# Patient Record
Sex: MALE | Race: WHITE | Employment: OTHER | URBAN - METROPOLITAN AREA
[De-identification: names, ages, dates, MRNs, and addresses within clinical notes are randomized per-mention and may not be internally consistent; named-entity substitution may affect disease eponyms.]

---

## 2017-01-26 RX ORDER — HYDROCODONE BITARTRATE AND ACETAMINOPHEN 5; 325 MG/1; MG/1
1 TABLET ORAL EVERY 6 HOURS PRN
Qty: 30 TABLET | Refills: 0 | Status: SHIPPED | OUTPATIENT
Start: 2017-01-26 | End: 2017-08-28

## 2017-01-26 RX ORDER — CYCLOBENZAPRINE HCL 10 MG
10 TABLET ORAL EVERY 8 HOURS PRN
Qty: 30 TABLET | Refills: 0 | Status: SHIPPED | OUTPATIENT
Start: 2017-01-26 | End: 2017-08-28

## 2017-01-27 ENCOUNTER — TELEPHONE (OUTPATIENT)
Dept: FAMILY MEDICINE CLINIC | Age: 72
End: 2017-01-27

## 2017-03-13 ENCOUNTER — TELEPHONE (OUTPATIENT)
Dept: FAMILY MEDICINE CLINIC | Age: 72
End: 2017-03-13

## 2017-03-13 LAB
A/G RATIO: 2.3 (ref 1.5–2.5)
ALBUMIN SERPL-MCNC: 4.5 GM/DL (ref 3.5–5)
ALP BLD-CCNC: 67 IU/L (ref 41–137)
ALT SERPL-CCNC: 21 IU/L (ref 10–40)
ANION GAP SERPL CALCULATED.3IONS-SCNC: 10 MMOL/L (ref 4–12)
AST SERPL-CCNC: 16 IU/L (ref 15–41)
BILIRUB SERPL-MCNC: 0.7 MG/DL (ref 0.2–1)
BUN BLDV-MCNC: 17 MG/DL (ref 7–20)
CALCIUM SERPL-MCNC: 9.2 MG/DL (ref 8.8–10.5)
CHLORIDE BLD-SCNC: 105 MEQ/L (ref 101–111)
CHOLESTEROL/HDL RELATIVE RISK: 3.2 (ref 4–5)
CHOLESTEROL: 154 MG/DL
CO2: 27 MEQ/L (ref 21–32)
CREAT SERPL-MCNC: 0.96 MG/DL (ref 0.7–1.3)
CREATININE CLEARANCE: >60
DIRECT-LDL / HDL RISK: 2.2
GLUCOSE: 107 MG/DL (ref 70–110)
HDLC SERPL-MCNC: 47 MG/DL
LDL CHOLESTEROL DIRECT: 106 MG/DL
POTASSIUM SERPL-SCNC: 4.5 MEQ/L (ref 3.6–5)
SODIUM BLD-SCNC: 142 MEQ/L (ref 135–145)
TOTAL PROTEIN: 6.5 G/DL (ref 6.2–8)
TRIGL SERPL-MCNC: 146 MG/DL
VLDLC SERPL CALC-MCNC: 29 MG/DL

## 2017-03-22 RX ORDER — RIZATRIPTAN BENZOATE 10 MG/1
10 TABLET ORAL
Qty: 30 TABLET | Refills: 0 | Status: SHIPPED | OUTPATIENT
Start: 2017-03-22 | End: 2017-11-06 | Stop reason: SDUPTHER

## 2017-04-24 RX ORDER — PITAVASTATIN CALCIUM 4.18 MG/1
TABLET, FILM COATED ORAL
Qty: 30 TABLET | Refills: 5 | Status: SHIPPED | OUTPATIENT
Start: 2017-04-24 | End: 2017-11-06 | Stop reason: SDUPTHER

## 2017-04-25 ENCOUNTER — OFFICE VISIT (OUTPATIENT)
Dept: FAMILY MEDICINE CLINIC | Age: 72
End: 2017-04-25

## 2017-04-25 ENCOUNTER — TELEPHONE (OUTPATIENT)
Dept: FAMILY MEDICINE CLINIC | Age: 72
End: 2017-04-25

## 2017-04-25 VITALS
TEMPERATURE: 97.6 F | RESPIRATION RATE: 24 BRPM | BODY MASS INDEX: 34.07 KG/M2 | SYSTOLIC BLOOD PRESSURE: 116 MMHG | DIASTOLIC BLOOD PRESSURE: 68 MMHG | WEIGHT: 251.2 LBS | HEART RATE: 72 BPM

## 2017-04-25 DIAGNOSIS — E78.5 HYPERLIPIDEMIA, UNSPECIFIED HYPERLIPIDEMIA TYPE: Chronic | ICD-10-CM
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
## 2017-04-25 DIAGNOSIS — R29.818 NEUROLOGIC ABNORMALITY: Primary | ICD-10-CM

## 2017-04-25 DIAGNOSIS — I10 ESSENTIAL HYPERTENSION: Chronic | ICD-10-CM

## 2017-04-25 PROCEDURE — 99214 OFFICE O/P EST MOD 30 MIN: CPT | Performed by: FAMILY MEDICINE

## 2017-04-25 PROCEDURE — G8427 DOCREV CUR MEDS BY ELIG CLIN: HCPCS | Performed by: FAMILY MEDICINE

## 2017-04-25 PROCEDURE — 4040F PNEUMOC VAC/ADMIN/RCVD: CPT | Performed by: FAMILY MEDICINE

## 2017-04-25 PROCEDURE — 3017F COLORECTAL CA SCREEN DOC REV: CPT | Performed by: FAMILY MEDICINE

## 2017-04-25 PROCEDURE — 4004F PT TOBACCO SCREEN RCVD TLK: CPT | Performed by: FAMILY MEDICINE

## 2017-04-25 PROCEDURE — 1123F ACP DISCUSS/DSCN MKR DOCD: CPT | Performed by: FAMILY MEDICINE

## 2017-04-25 PROCEDURE — G8417 CALC BMI ABV UP PARAM F/U: HCPCS | Performed by: FAMILY MEDICINE

## 2017-04-25 RX ORDER — OMEPRAZOLE 20 MG/1
1 CAPSULE, DELAYED RELEASE ORAL DAILY
COMMUNITY
Start: 2017-04-24 | End: 2017-08-28

## 2017-04-25 RX ORDER — GABAPENTIN 300 MG/1
1 CAPSULE ORAL 2 TIMES DAILY
Refills: 2 | COMMUNITY
Start: 2017-03-31

## 2017-04-26 ASSESSMENT — ENCOUNTER SYMPTOMS
SORE THROAT: 0
SINUS PRESSURE: 0
ABDOMINAL DISTENTION: 0
RHINORRHEA: 0
CONSTIPATION: 0
SHORTNESS OF BREATH: 0
EYE PAIN: 0
COUGH: 0
DIARRHEA: 0
ABDOMINAL PAIN: 0
NAUSEA: 0

## 2017-04-28 PROBLEM — K57.32 DIVERTICULITIS OF SIGMOID COLON: Status: ACTIVE | Noted: 2017-04-28

## 2017-05-01 ENCOUNTER — TELEPHONE (OUTPATIENT)
Dept: FAMILY MEDICINE CLINIC | Age: 72
End: 2017-05-01

## 2017-05-04 ENCOUNTER — OFFICE VISIT (OUTPATIENT)
Dept: FAMILY MEDICINE CLINIC | Age: 72
End: 2017-05-04

## 2017-05-04 VITALS
BODY MASS INDEX: 33.72 KG/M2 | RESPIRATION RATE: 24 BRPM | HEART RATE: 76 BPM | DIASTOLIC BLOOD PRESSURE: 64 MMHG | HEIGHT: 72 IN | WEIGHT: 249 LBS | SYSTOLIC BLOOD PRESSURE: 100 MMHG | TEMPERATURE: 97.4 F

## 2017-05-04 DIAGNOSIS — K57.32 DIVERTICULITIS OF SIGMOID COLON: Primary | ICD-10-CM

## 2017-05-04 PROCEDURE — 99495 TRANSJ CARE MGMT MOD F2F 14D: CPT | Performed by: FAMILY MEDICINE

## 2017-05-30 ENCOUNTER — INITIAL CONSULT (OUTPATIENT)
Dept: PHYSICAL MEDICINE AND REHAB | Age: 72
End: 2017-05-30

## 2017-05-30 VITALS
DIASTOLIC BLOOD PRESSURE: 80 MMHG | SYSTOLIC BLOOD PRESSURE: 145 MMHG | WEIGHT: 250.8 LBS | HEART RATE: 64 BPM | BODY MASS INDEX: 33.97 KG/M2 | HEIGHT: 72 IN

## 2017-05-30 DIAGNOSIS — R40.4 EPISODIC ALTERED AWARENESS: ICD-10-CM

## 2017-05-30 DIAGNOSIS — R55 SYNCOPE AND COLLAPSE: Primary | ICD-10-CM

## 2017-05-30 PROCEDURE — 1123F ACP DISCUSS/DSCN MKR DOCD: CPT | Performed by: PSYCHIATRY & NEUROLOGY

## 2017-05-30 PROCEDURE — 3017F COLORECTAL CA SCREEN DOC REV: CPT | Performed by: PSYCHIATRY & NEUROLOGY

## 2017-05-30 PROCEDURE — 99204 OFFICE O/P NEW MOD 45 MIN: CPT | Performed by: PSYCHIATRY & NEUROLOGY

## 2017-05-30 PROCEDURE — 1111F DSCHRG MED/CURRENT MED MERGE: CPT | Performed by: PSYCHIATRY & NEUROLOGY

## 2017-05-30 PROCEDURE — G8427 DOCREV CUR MEDS BY ELIG CLIN: HCPCS | Performed by: PSYCHIATRY & NEUROLOGY

## 2017-05-30 PROCEDURE — G8417 CALC BMI ABV UP PARAM F/U: HCPCS | Performed by: PSYCHIATRY & NEUROLOGY

## 2017-05-30 PROCEDURE — 4004F PT TOBACCO SCREEN RCVD TLK: CPT | Performed by: PSYCHIATRY & NEUROLOGY

## 2017-05-30 PROCEDURE — 4040F PNEUMOC VAC/ADMIN/RCVD: CPT | Performed by: PSYCHIATRY & NEUROLOGY

## 2017-06-01 ENCOUNTER — TELEPHONE (OUTPATIENT)
Dept: PHYSICAL MEDICINE AND REHAB | Age: 72
End: 2017-06-01

## 2017-06-01 LAB
FOLATE: 14.7 NG/ML
VITAMIN B-12: 246 PG/ML (ref 180–914)

## 2017-06-20 ENCOUNTER — TELEPHONE (OUTPATIENT)
Dept: PHYSICAL MEDICINE AND REHAB | Age: 72
End: 2017-06-20

## 2017-06-22 RX ORDER — ALBUTEROL SULFATE 90 UG/1
2 AEROSOL, METERED RESPIRATORY (INHALATION) EVERY 6 HOURS PRN
Qty: 1 INHALER | Refills: 5 | Status: SHIPPED | OUTPATIENT
Start: 2017-06-22 | End: 2017-08-28

## 2017-07-10 ENCOUNTER — OFFICE VISIT (OUTPATIENT)
Dept: PHYSICAL MEDICINE AND REHAB | Age: 72
End: 2017-07-10

## 2017-07-10 VITALS
WEIGHT: 253.2 LBS | HEART RATE: 67 BPM | SYSTOLIC BLOOD PRESSURE: 117 MMHG | DIASTOLIC BLOOD PRESSURE: 75 MMHG | BODY MASS INDEX: 34.29 KG/M2 | HEIGHT: 72 IN

## 2017-07-10 DIAGNOSIS — R55 SYNCOPE AND COLLAPSE: Primary | ICD-10-CM

## 2017-07-10 DIAGNOSIS — R40.4 EPISODIC ALTERED AWARENESS: ICD-10-CM

## 2017-07-10 PROCEDURE — 4040F PNEUMOC VAC/ADMIN/RCVD: CPT | Performed by: PSYCHIATRY & NEUROLOGY

## 2017-07-10 PROCEDURE — 3017F COLORECTAL CA SCREEN DOC REV: CPT | Performed by: PSYCHIATRY & NEUROLOGY

## 2017-07-10 PROCEDURE — G8417 CALC BMI ABV UP PARAM F/U: HCPCS | Performed by: PSYCHIATRY & NEUROLOGY

## 2017-07-10 PROCEDURE — 1123F ACP DISCUSS/DSCN MKR DOCD: CPT | Performed by: PSYCHIATRY & NEUROLOGY

## 2017-07-10 PROCEDURE — 99213 OFFICE O/P EST LOW 20 MIN: CPT | Performed by: PSYCHIATRY & NEUROLOGY

## 2017-07-10 PROCEDURE — G8427 DOCREV CUR MEDS BY ELIG CLIN: HCPCS | Performed by: PSYCHIATRY & NEUROLOGY

## 2017-07-10 PROCEDURE — 4004F PT TOBACCO SCREEN RCVD TLK: CPT | Performed by: PSYCHIATRY & NEUROLOGY

## 2017-07-12 LAB — VITAMIN B-12: 560 PG/ML (ref 180–914)

## 2017-07-18 ENCOUNTER — HOSPITAL ENCOUNTER (OUTPATIENT)
Dept: NON INVASIVE DIAGNOSTICS | Age: 72
Discharge: HOME | End: 2017-07-18
Payer: MEDICARE

## 2017-07-18 DIAGNOSIS — R55 SYNCOPE AND COLLAPSE: ICD-10-CM

## 2017-07-18 DIAGNOSIS — R40.4 EPISODIC ALTERED AWARENESS: ICD-10-CM

## 2017-07-18 PROCEDURE — 93270 REMOTE 30 DAY ECG REV/REPORT: CPT

## 2017-07-25 ENCOUNTER — TELEPHONE (OUTPATIENT)
Dept: PHYSICAL MEDICINE AND REHAB | Age: 72
End: 2017-07-25

## 2017-08-21 ENCOUNTER — TELEPHONE (OUTPATIENT)
Dept: NEUROLOGY | Age: 72
End: 2017-08-21

## 2017-08-21 DIAGNOSIS — R55 SYNCOPE AND COLLAPSE: Primary | ICD-10-CM

## 2017-08-24 ENCOUNTER — OFFICE VISIT (OUTPATIENT)
Dept: CARDIOLOGY CLINIC | Age: 72
End: 2017-08-24
Payer: MEDICARE

## 2017-08-24 VITALS
BODY MASS INDEX: 33.97 KG/M2 | WEIGHT: 250.8 LBS | HEART RATE: 66 BPM | SYSTOLIC BLOOD PRESSURE: 162 MMHG | HEIGHT: 72 IN | DIASTOLIC BLOOD PRESSURE: 88 MMHG

## 2017-08-24 DIAGNOSIS — E78.01 FAMILIAL HYPERCHOLESTEROLEMIA: ICD-10-CM

## 2017-08-24 DIAGNOSIS — I10 ESSENTIAL HYPERTENSION: ICD-10-CM

## 2017-08-24 DIAGNOSIS — R55 SYNCOPE, UNSPECIFIED SYNCOPE TYPE: ICD-10-CM

## 2017-08-24 DIAGNOSIS — R07.2 PRECORDIAL PAIN: Primary | ICD-10-CM

## 2017-08-24 PROCEDURE — 99215 OFFICE O/P EST HI 40 MIN: CPT | Performed by: NUCLEAR MEDICINE

## 2017-08-24 PROCEDURE — 3017F COLORECTAL CA SCREEN DOC REV: CPT | Performed by: NUCLEAR MEDICINE

## 2017-08-24 PROCEDURE — 93000 ELECTROCARDIOGRAM COMPLETE: CPT | Performed by: NUCLEAR MEDICINE

## 2017-08-24 PROCEDURE — G8427 DOCREV CUR MEDS BY ELIG CLIN: HCPCS | Performed by: NUCLEAR MEDICINE

## 2017-08-24 PROCEDURE — 4040F PNEUMOC VAC/ADMIN/RCVD: CPT | Performed by: NUCLEAR MEDICINE

## 2017-08-24 PROCEDURE — G8417 CALC BMI ABV UP PARAM F/U: HCPCS | Performed by: NUCLEAR MEDICINE

## 2017-08-24 PROCEDURE — 1123F ACP DISCUSS/DSCN MKR DOCD: CPT | Performed by: NUCLEAR MEDICINE

## 2017-08-24 PROCEDURE — 4004F PT TOBACCO SCREEN RCVD TLK: CPT | Performed by: NUCLEAR MEDICINE

## 2017-08-31 ENCOUNTER — HOSPITAL ENCOUNTER (OUTPATIENT)
Dept: NON INVASIVE DIAGNOSTICS | Age: 72
Discharge: HOME | End: 2017-08-31
Payer: MEDICARE

## 2017-08-31 DIAGNOSIS — R55 SYNCOPE, UNSPECIFIED SYNCOPE TYPE: ICD-10-CM

## 2017-08-31 DIAGNOSIS — R07.2 PRECORDIAL PAIN: ICD-10-CM

## 2017-08-31 LAB
LV EF: 55 %
LVEF MODALITY: NORMAL

## 2017-08-31 PROCEDURE — 78452 HT MUSCLE IMAGE SPECT MULT: CPT

## 2017-08-31 PROCEDURE — A9500 TC99M SESTAMIBI: HCPCS | Performed by: NUCLEAR MEDICINE

## 2017-08-31 PROCEDURE — 93306 TTE W/DOPPLER COMPLETE: CPT

## 2017-08-31 PROCEDURE — 6360000002 HC RX W HCPCS

## 2017-08-31 PROCEDURE — 3430000000 HC RX DIAGNOSTIC RADIOPHARMACEUTICAL: Performed by: NUCLEAR MEDICINE

## 2017-08-31 PROCEDURE — 93017 CV STRESS TEST TRACING ONLY: CPT | Performed by: NUCLEAR MEDICINE

## 2017-08-31 PROCEDURE — 93017 CV STRESS TEST TRACING ONLY: CPT

## 2017-08-31 RX ADMIN — Medication 9.5 MILLICURIE: at 12:15

## 2017-08-31 RX ADMIN — Medication 32.8 MILLICURIE: at 13:15

## 2017-09-05 ENCOUNTER — HOSPITAL ENCOUNTER (OUTPATIENT)
Dept: INPATIENT UNIT | Age: 72
Discharge: HOME | End: 2017-09-05
Attending: NUCLEAR MEDICINE | Admitting: NUCLEAR MEDICINE
Payer: MEDICARE

## 2017-09-05 VITALS
BODY MASS INDEX: 33.86 KG/M2 | HEIGHT: 72 IN | DIASTOLIC BLOOD PRESSURE: 75 MMHG | SYSTOLIC BLOOD PRESSURE: 141 MMHG | HEART RATE: 70 BPM | TEMPERATURE: 97.7 F | RESPIRATION RATE: 19 BRPM | OXYGEN SATURATION: 96 % | WEIGHT: 250 LBS

## 2017-09-05 LAB
APTT: 30 SECONDS (ref 22–38)
INR BLD: 0.91 (ref 0.85–1.13)

## 2017-09-05 PROCEDURE — 33282 HC IMPLANTABLE LOOP RECORDER: CPT | Performed by: INTERNAL MEDICINE

## 2017-09-05 PROCEDURE — 2580000003 HC RX 258: Performed by: INTERNAL MEDICINE

## 2017-09-05 PROCEDURE — 6360000002 HC RX W HCPCS: Performed by: INTERNAL MEDICINE

## 2017-09-05 PROCEDURE — 36415 COLL VENOUS BLD VENIPUNCTURE: CPT

## 2017-09-05 PROCEDURE — A6219 GAUZE <= 16 SQ IN W/BORDER: HCPCS

## 2017-09-05 PROCEDURE — 2500000003 HC RX 250 WO HCPCS

## 2017-09-05 PROCEDURE — C1764 EVENT RECORDER, CARDIAC: HCPCS

## 2017-09-05 PROCEDURE — 6360000002 HC RX W HCPCS

## 2017-09-05 PROCEDURE — 33282 PR IMPLANTATION PT-ACTIVATED CARDIAC EVENT RECORDER: CPT | Performed by: INTERNAL MEDICINE

## 2017-09-05 PROCEDURE — 85730 THROMBOPLASTIN TIME PARTIAL: CPT

## 2017-09-05 PROCEDURE — 85610 PROTHROMBIN TIME: CPT

## 2017-09-05 RX ORDER — DOCUSATE SODIUM 100 MG/1
100 CAPSULE, LIQUID FILLED ORAL 2 TIMES DAILY
Status: DISCONTINUED | OUTPATIENT
Start: 2017-09-05 | End: 2017-09-05 | Stop reason: HOSPADM

## 2017-09-05 RX ORDER — ACETAMINOPHEN 325 MG/1
650 TABLET ORAL EVERY 4 HOURS PRN
Status: DISCONTINUED | OUTPATIENT
Start: 2017-09-05 | End: 2017-09-05 | Stop reason: HOSPADM

## 2017-09-05 RX ORDER — SODIUM CHLORIDE 0.9 % (FLUSH) 0.9 %
10 SYRINGE (ML) INJECTION PRN
Status: DISCONTINUED | OUTPATIENT
Start: 2017-09-05 | End: 2017-09-05 | Stop reason: HOSPADM

## 2017-09-05 RX ORDER — ONDANSETRON 2 MG/ML
4 INJECTION INTRAMUSCULAR; INTRAVENOUS EVERY 6 HOURS PRN
Status: DISCONTINUED | OUTPATIENT
Start: 2017-09-05 | End: 2017-09-05 | Stop reason: HOSPADM

## 2017-09-05 RX ORDER — SODIUM CHLORIDE 0.9 % (FLUSH) 0.9 %
10 SYRINGE (ML) INJECTION EVERY 12 HOURS SCHEDULED
Status: CANCELLED | OUTPATIENT
Start: 2017-09-05

## 2017-09-05 RX ORDER — SODIUM CHLORIDE 0.9 % (FLUSH) 0.9 %
10 SYRINGE (ML) INJECTION EVERY 12 HOURS SCHEDULED
Status: DISCONTINUED | OUTPATIENT
Start: 2017-09-05 | End: 2017-09-05 | Stop reason: HOSPADM

## 2017-09-05 RX ORDER — SODIUM CHLORIDE 9 MG/ML
INJECTION, SOLUTION INTRAVENOUS CONTINUOUS
Status: DISCONTINUED | OUTPATIENT
Start: 2017-09-05 | End: 2017-09-05 | Stop reason: HOSPADM

## 2017-09-05 RX ADMIN — CEFAZOLIN SODIUM 1 G: 1 INJECTION, SOLUTION INTRAVENOUS at 10:52

## 2017-09-05 RX ADMIN — SODIUM CHLORIDE: 9 INJECTION, SOLUTION INTRAVENOUS at 09:08

## 2017-09-06 ENCOUNTER — TELEPHONE (OUTPATIENT)
Dept: FAMILY MEDICINE CLINIC | Age: 72
End: 2017-09-06

## 2017-09-14 ENCOUNTER — NURSE ONLY (OUTPATIENT)
Dept: CARDIOLOGY CLINIC | Age: 72
End: 2017-09-14
Payer: MEDICARE

## 2017-09-14 DIAGNOSIS — Z45.09 ENCOUNTER FOR ELECTRONIC ANALYSIS OF REVEAL EVENT RECORDER: Primary | ICD-10-CM

## 2017-09-14 PROCEDURE — 93285 PRGRMG DEV EVAL SCRMS IP: CPT | Performed by: NUCLEAR MEDICINE

## 2017-10-09 ENCOUNTER — PROCEDURE VISIT (OUTPATIENT)
Dept: CARDIOLOGY CLINIC | Age: 72
End: 2017-10-09

## 2017-10-09 DIAGNOSIS — Z45.09 ENCOUNTER FOR ELECTRONIC ANALYSIS OF REVEAL EVENT RECORDER: Primary | ICD-10-CM

## 2017-10-12 ENCOUNTER — TELEPHONE (OUTPATIENT)
Dept: CARDIOLOGY CLINIC | Age: 72
End: 2017-10-12

## 2017-10-12 NOTE — TELEPHONE ENCOUNTER
Aware I got an alert from [facility] to have pt send a manual download.   Call to pt, asked to send, states he will try around 3:30

## 2017-10-13 ENCOUNTER — TELEPHONE (OUTPATIENT)
Dept: CARDIOLOGY CLINIC | Age: 72
End: 2017-10-13

## 2017-10-30 ENCOUNTER — OFFICE VISIT (OUTPATIENT)
Dept: NEUROLOGY | Age: 72
End: 2017-10-30
Payer: MEDICARE

## 2017-10-30 VITALS
HEART RATE: 68 BPM | HEIGHT: 72 IN | BODY MASS INDEX: 34.95 KG/M2 | DIASTOLIC BLOOD PRESSURE: 74 MMHG | WEIGHT: 258 LBS | SYSTOLIC BLOOD PRESSURE: 126 MMHG

## 2017-10-30 DIAGNOSIS — R55 SYNCOPE AND COLLAPSE: Primary | ICD-10-CM

## 2017-10-30 PROCEDURE — 99213 OFFICE O/P EST LOW 20 MIN: CPT | Performed by: PSYCHIATRY & NEUROLOGY

## 2017-10-30 PROCEDURE — G8484 FLU IMMUNIZE NO ADMIN: HCPCS | Performed by: PSYCHIATRY & NEUROLOGY

## 2017-10-30 PROCEDURE — G8417 CALC BMI ABV UP PARAM F/U: HCPCS | Performed by: PSYCHIATRY & NEUROLOGY

## 2017-10-30 PROCEDURE — 4004F PT TOBACCO SCREEN RCVD TLK: CPT | Performed by: PSYCHIATRY & NEUROLOGY

## 2017-10-30 PROCEDURE — G8427 DOCREV CUR MEDS BY ELIG CLIN: HCPCS | Performed by: PSYCHIATRY & NEUROLOGY

## 2017-10-30 PROCEDURE — 1123F ACP DISCUSS/DSCN MKR DOCD: CPT | Performed by: PSYCHIATRY & NEUROLOGY

## 2017-10-30 PROCEDURE — 4040F PNEUMOC VAC/ADMIN/RCVD: CPT | Performed by: PSYCHIATRY & NEUROLOGY

## 2017-10-30 PROCEDURE — 3017F COLORECTAL CA SCREEN DOC REV: CPT | Performed by: PSYCHIATRY & NEUROLOGY

## 2017-10-30 NOTE — PROGRESS NOTES
NEUROLOGY OUT PATIENT FOLLOW UP NOTE:  10/30/439135:09 AM    Torres Montelongo is here for follow up for syncope and episodic altered awareness. He denies new symptoms. He had testing performed and is here to go over the results. ROS:  Respiratory : no cough, no hemoptysis. Cardiac: no chest pain. No palpitations. Renal : no flank pain, no hematuria    Skin: no rash  Reviewed labs since last evaluation and discussed with patient. Allergies   Allergen Reactions    Pravastatin      Joint pain       Current Outpatient Prescriptions:     gabapentin (NEURONTIN) 300 MG capsule, Take 1 capsule by mouth 2 times daily, Disp: , Rfl: 2    LIVALO 4 MG TABS tablet, TAKE ONE TABLET BY MOUTH EVERY EVENING, Disp: 30 tablet, Rfl: 5    rizatriptan (MAXALT) 10 MG tablet, Take 1 tablet by mouth once as needed for Migraine May repeat in 2 hours if needed, Disp: 30 tablet, Rfl: 0    metoprolol tartrate (LOPRESSOR) 25 MG tablet, Take 1 tablet by mouth 2 times daily, Disp: 180 tablet, Rfl: 3    ezetimibe (ZETIA) 10 MG tablet, Take 1 tablet by mouth daily, Disp: 90 tablet, Rfl: 3    lisinopril (PRINIVIL;ZESTRIL) 20 MG tablet, Take 1 tablet by mouth daily, Disp: 90 tablet, Rfl: 3    aspirin EC 81 MG EC tablet, Take 81 mg by mouth daily. , Disp: , Rfl:       PE:   Vitals:    10/30/17 0938   BP: 126/74   Site: Left Arm   Position: Sitting   Cuff Size: Large Adult   Pulse: 68   Weight: 258 lb (117 kg)   Height: 6' (1.829 m)     General Appearance:  alert and cooperative  Skin:  Skin color, texture, turgor normal. No rashes or lesions. Gen: AO3, NAD, Language is Intact. Head: PERRL, EOMI, no icterus  Neck: There is no carotid bruits. The Neck is supple. Neuro: CN 2-12 grossly intact with no focal deficits. Power 5/5 Throughout symmetric, Reflexes are decreased and symmetric. Long tracts are intact. Cerebellar exam is Intact. Sensory exam is intact to light touch. Gait is intact.   Musculoskeletal:  Has no hand arthritis, no Senescent changes are present without acute intracranial abnormality identified.       2. Mucosal sinus disease with advanced mucosal thickening in the right maxillary sinus.         7/12/2017  8:01 PM - [facility], [facility] Component Results     Component Value Ref Range & Units Status Collected Lab   Vitamin B-12 560  180 - 914 pg/mL Final 07/12/2017  9:45 AM [facility]         Assessment:    1. Syncope and collapse          He denies new events. He was found to have a sinus pause of 3.6 sec and arrhythmia on reveal monitor and is being evaluated for pacemaker. He is on B12 Supplements. His work up is inconclusive. His exam is non focal.  After detailed discussion with patient we agreed on the following plan. Plan:  1. Continue with B12 supplements. 2. Call with any new symptoms or concerns. 3. Follow up as needed. Please call if any questions.      Corina Osborne MD

## 2017-10-31 ENCOUNTER — OFFICE VISIT (OUTPATIENT)
Dept: CARDIOLOGY CLINIC | Age: 72
End: 2017-10-31

## 2017-10-31 VITALS
SYSTOLIC BLOOD PRESSURE: 138 MMHG | DIASTOLIC BLOOD PRESSURE: 84 MMHG | HEART RATE: 80 BPM | BODY MASS INDEX: 34.62 KG/M2 | HEIGHT: 72 IN | WEIGHT: 255.6 LBS

## 2017-10-31 DIAGNOSIS — E78.5 HYPERLIPIDEMIA, UNSPECIFIED HYPERLIPIDEMIA TYPE: Chronic | ICD-10-CM

## 2017-10-31 DIAGNOSIS — R55 NEAR SYNCOPE: ICD-10-CM

## 2017-10-31 DIAGNOSIS — I10 ESSENTIAL HYPERTENSION: Chronic | ICD-10-CM

## 2017-10-31 PROCEDURE — 99024 POSTOP FOLLOW-UP VISIT: CPT | Performed by: INTERNAL MEDICINE

## 2017-10-31 ASSESSMENT — ENCOUNTER SYMPTOMS
EYES NEGATIVE: 1
GASTROINTESTINAL NEGATIVE: 1
RESPIRATORY NEGATIVE: 1

## 2017-10-31 NOTE — PROGRESS NOTES
and oriented to person, place, and time. He has normal reflexes. No cranial nerve deficit. Skin: Skin is warm. No rash noted. Psychiatric: He has a normal mood and affect.        /84   Pulse 80   Ht 6' (1.829 m)   Wt 255 lb 9.6 oz (115.9 kg)   BMI 34.67 kg/m²   Wt Readings from Last 3 Encounters:   10/31/17 255 lb 9.6 oz (115.9 kg)   10/30/17 258 lb (117 kg)   09/05/17 250 lb (113.4 kg)     BP Readings from Last 3 Encounters:   10/31/17 138/84   10/30/17 126/74   09/05/17 (!) 141/75       Lab Data  CBC:   Lab Results   Component Value Date    WBC 8.8 04/29/2017    RBC 3.83 04/29/2017    HGB 12.9 04/29/2017    HGB 14.9 04/28/2017    HGB 15.8 11/10/2016    HCT 37.7 04/29/2017    MCV 98.5 04/29/2017    MCH 33.6 04/29/2017    MCHC 34.1 04/29/2017    RDW 14.4 04/29/2017     04/29/2017     04/28/2017     11/10/2016    MPV 7.4 04/29/2017     CMP:    Lab Results   Component Value Date     04/29/2017    K 4.2 04/29/2017     04/29/2017    CO2 22 04/29/2017    BUN 10 04/29/2017    CREATININE 0.8 04/29/2017    CREATININE 0.8 04/28/2017    CREATININE 0.96 03/13/2017    AGRATIO 2.3 03/13/2017    LABGLOM >90 04/29/2017    GLUCOSE 103 04/29/2017    GLUCOSE 107 03/13/2017    PROT 6.7 04/28/2017    LABALBU 3.6 04/28/2017    CALCIUM 8.7 04/29/2017    BILITOT 0.2 04/28/2017    ALKPHOS 84 04/28/2017    AST 19 04/28/2017    ALT 22 04/28/2017     Hepatic Function Panel:    Lab Results   Component Value Date    ALKPHOS 84 04/28/2017    ALT 22 04/28/2017    AST 19 04/28/2017    PROT 6.7 04/28/2017    BILITOT 0.2 04/28/2017    BILIDIR <0.2 04/28/2017    LABALBU 3.6 04/28/2017     Magnesium:  No results found for: MG  PT/INR:    Lab Results   Component Value Date    INR 0.91 09/05/2017    INR 0.77 08/20/2013     HgBA1c:  No results found for: LABA1C  FLP:    Lab Results   Component Value Date    TRIG 146 03/13/2017    TRIG 337 11/10/2016    TRIG 213 11/18/2015    HDL 47 03/13/2017    HDL 39 11/10/2016    HDL 34 11/18/2015    HDL 37 07/07/2011    LDLCALC 122 11/26/2013    LDLCALC 64 12/28/2012    LDLCALC 97 07/03/2012    LDLDIRECT 106 03/13/2017    LDLDIRECT 181 11/10/2016    LDLDIRECT 130 11/18/2015     TSH:    Lab Results   Component Value Date    TSH 2.57 11/10/2016     No results for input(s): CKTOTAL, CKMB, CKMBINDEX, TROPONINI in the last 72 hours. Assessment:   Mr. Solange Christianson is a 71 y.o. who is known to us with history of loop recorder implantation who has been having syncopal episodes without explanation. He has had a loop recorder which showed long pause of 3.6 seconds due to complete heart block. I had a long discussion with the patient and his wife about the symptoms and findings. They have been very concerned about his symptoms, and he does not want another episode of syncope. He is an active person ands he drives. The following diagnoses were addressed during this visit:  1. Near syncope     2. Hyperlipidemia, unspecified hyperlipidemia type     3. Essential hypertension           Plan:     Schedule pacemaker implantation. I went over all his questions and concerns and he expressed that he is satisfied. All the risks and limitations of pacemaker implantation and management were explained in details to the patient and family. Bleeding, immediate and long-term infection, cardiac artrhythmias, cardiac perforation, pneumothorax, vein thrombosis and possible occlusion, chronic pain and discomfort, lead dislodgement, and even death, were explained among other potential risks. The patient and family expressed full understanding and agreed to proceed. Orders Placed:  No orders of the defined types were placed in this encounter. Medications:  No orders of the defined types were placed in this encounter. Discussed use, benefit, and side effects of prescribed medications. All patient questions answered. Pt voiced understanding.  Instructed to continue current medications, diet and exercise. Continue risk factor modification and medical management. Patient agreed with treatment plan. Follow up as directed. The patient will be seen in 1 months for re-evaluation or sooner if he develops new symptoms. In the mean time, the patient will follow up with Erika Recinos MD as scheduled.

## 2017-11-02 VITALS — BODY MASS INDEX: 34.54 KG/M2 | WEIGHT: 255 LBS | HEIGHT: 72 IN

## 2017-11-02 RX ORDER — MULTIVIT WITH MINERALS/LUTEIN
250 TABLET ORAL DAILY
Status: ON HOLD | COMMUNITY
End: 2017-11-09 | Stop reason: ALTCHOICE

## 2017-11-06 ENCOUNTER — OFFICE VISIT (OUTPATIENT)
Dept: FAMILY MEDICINE CLINIC | Age: 72
End: 2017-11-06
Payer: MEDICARE

## 2017-11-06 VITALS
HEART RATE: 72 BPM | RESPIRATION RATE: 20 BRPM | WEIGHT: 236.7 LBS | DIASTOLIC BLOOD PRESSURE: 68 MMHG | BODY MASS INDEX: 33.14 KG/M2 | HEIGHT: 71 IN | TEMPERATURE: 97.4 F | SYSTOLIC BLOOD PRESSURE: 112 MMHG

## 2017-11-06 DIAGNOSIS — K21.9 GASTROESOPHAGEAL REFLUX DISEASE, ESOPHAGITIS PRESENCE NOT SPECIFIED: ICD-10-CM

## 2017-11-06 DIAGNOSIS — Z00.00 ROUTINE GENERAL MEDICAL EXAMINATION AT A HEALTH CARE FACILITY: ICD-10-CM

## 2017-11-06 DIAGNOSIS — Z98.890 S/P LUMBAR LAMINECTOMY: ICD-10-CM

## 2017-11-06 DIAGNOSIS — E78.5 HYPERLIPIDEMIA, UNSPECIFIED HYPERLIPIDEMIA TYPE: Chronic | ICD-10-CM

## 2017-11-06 DIAGNOSIS — I49.9 CARDIAC ARRHYTHMIA, UNSPECIFIED CARDIAC ARRHYTHMIA TYPE: ICD-10-CM

## 2017-11-06 DIAGNOSIS — I10 ESSENTIAL HYPERTENSION: Primary | Chronic | ICD-10-CM

## 2017-11-06 PROCEDURE — 90662 IIV NO PRSV INCREASED AG IM: CPT | Performed by: FAMILY MEDICINE

## 2017-11-06 PROCEDURE — 4004F PT TOBACCO SCREEN RCVD TLK: CPT | Performed by: FAMILY MEDICINE

## 2017-11-06 PROCEDURE — G8417 CALC BMI ABV UP PARAM F/U: HCPCS | Performed by: FAMILY MEDICINE

## 2017-11-06 PROCEDURE — 99214 OFFICE O/P EST MOD 30 MIN: CPT | Performed by: FAMILY MEDICINE

## 2017-11-06 PROCEDURE — G8484 FLU IMMUNIZE NO ADMIN: HCPCS | Performed by: FAMILY MEDICINE

## 2017-11-06 PROCEDURE — 4040F PNEUMOC VAC/ADMIN/RCVD: CPT | Performed by: FAMILY MEDICINE

## 2017-11-06 PROCEDURE — 1123F ACP DISCUSS/DSCN MKR DOCD: CPT | Performed by: FAMILY MEDICINE

## 2017-11-06 PROCEDURE — G8427 DOCREV CUR MEDS BY ELIG CLIN: HCPCS | Performed by: FAMILY MEDICINE

## 2017-11-06 PROCEDURE — G0008 ADMIN INFLUENZA VIRUS VAC: HCPCS | Performed by: FAMILY MEDICINE

## 2017-11-06 PROCEDURE — 3017F COLORECTAL CA SCREEN DOC REV: CPT | Performed by: FAMILY MEDICINE

## 2017-11-06 RX ORDER — EZETIMIBE 10 MG/1
10 TABLET ORAL DAILY
Qty: 90 TABLET | Refills: 3 | Status: SHIPPED | OUTPATIENT
Start: 2017-11-06

## 2017-11-06 RX ORDER — RIZATRIPTAN BENZOATE 10 MG/1
10 TABLET ORAL
Qty: 30 TABLET | Refills: 0 | Status: SHIPPED | OUTPATIENT
Start: 2017-11-06 | End: 2017-11-14

## 2017-11-06 RX ORDER — LANOLIN ALCOHOL/MO/W.PET/CERES
3000 CREAM (GRAM) TOPICAL DAILY
COMMUNITY

## 2017-11-06 RX ORDER — LISINOPRIL 20 MG/1
20 TABLET ORAL DAILY
Qty: 90 TABLET | Refills: 3 | Status: SHIPPED | OUTPATIENT
Start: 2017-11-06

## 2017-11-06 NOTE — PROGRESS NOTES
After obtaining consent, and per orders of Dr Dustin Hernandez, patient given Fluzone high dose 0.5 ml in left deltoid IM by Mei Ramos CMA. Instructed to notify us of any problems.

## 2017-11-06 NOTE — PATIENT INSTRUCTIONS
You may receive a survey about your visit with us today. The feedback from our patients helps us identify what is working well and where the service to all patients can be enhanced. Thank you! Patient Education        Stopping Smoking: Care Instructions  Your Care Instructions  Cigarette smokers crave the nicotine in cigarettes. Giving it up is much harder than simply changing a habit. Your body has to stop craving the nicotine. It is hard to quit, but you can do it. There are many tools that people use to quit smoking. You may find that combining tools works best for you. There are several steps to quitting. First you get ready to quit. Then you get support to help you. After that, you learn new skills and behaviors to become a nonsmoker. For many people, a necessary step is getting and using medicine. Your doctor will help you set up the plan that best meets your needs. You may want to attend a smoking cessation program to help you quit smoking. When you choose a program, look for one that has proven success. Ask your doctor for ideas. You will greatly increase your chances of success if you take medicine as well as get counseling or join a cessation program.  Some of the changes you feel when you first quit tobacco are uncomfortable. Your body will miss the nicotine at first, and you may feel short-tempered and grumpy. You may have trouble sleeping or concentrating. Medicine can help you deal with these symptoms. You may struggle with changing your smoking habits and rituals. The last step is the tricky one: Be prepared for the smoking urge to continue for a time. This is a lot to deal with, but keep at it. You will feel better. Follow-up care is a key part of your treatment and safety. Be sure to make and go to all appointments, and call your doctor if you are having problems. Its also a good idea to know your test results and keep a list of the medicines you take.   How can you care for yourself at home?  · Ask your family, friends, and coworkers for support. You have a better chance of quitting if you have help and support. · Join a support group, such as Nicotine Anonymous, for people who are trying to quit smoking. · Consider signing up for a smoking cessation program, such as the American Lung Association's Freedom from Smoking program.  · Set a quit date. Pick your date carefully so that it is not right in the middle of a big deadline or stressful time. Once you quit, do not even take a puff. Get rid of all ashtrays and lighters after your last cigarette. Clean your house and your clothes so that they do not smell of smoke. · Learn how to be a nonsmoker. Think about ways you can avoid those things that make you reach for a cigarette. ¨ Avoid situations that put you at greatest risk for smoking. For some people, it is hard to have a drink with friends without smoking. For others, they might skip a coffee break with coworkers who smoke. ¨ Change your daily routine. Take a different route to work or eat a meal in a different place. · Cut down on stress. Calm yourself or release tension by doing an activity you enjoy, such as reading a book, taking a hot bath, or gardening. · Talk to your doctor or pharmacist about nicotine replacement therapy, which replaces the nicotine in your body. You still get nicotine but you do not use tobacco. Nicotine replacement products help you slowly reduce the amount of nicotine you need. These products come in several forms, many of them available over-the-counter:  ¨ Nicotine patches  ¨ Nicotine gum and lozenges  ¨ Nicotine inhaler  · Ask your doctor about bupropion (Wellbutrin) or varenicline (Chantix), which are prescription medicines. They do not contain nicotine. They help you by reducing withdrawal symptoms, such as stress and anxiety. · Some people find hypnosis, acupuncture, and massage helpful for ending the smoking habit.   · Eat a healthy diet and get regular exercise. Having healthy habits will help your body move past its craving for nicotine. · Be prepared to keep trying. Most people are not successful the first few times they try to quit. Do not get mad at yourself if you smoke again. Make a list of things you learned and think about when you want to try again, such as next week, next month, or next year. Where can you learn more? Go to https://Nanosyspegeraldine.ThirdLove. org and sign in to your [employer] account. Enter Q466 in the [employer] box to learn more about \"Stopping Smoking: Care Instructions. \"     If you do not have an account, please click on the \"Sign Up Now\" link. Current as of: March 20, 2017  Content Version: 11.3  © 6382-0341 [employer]. Care instructions adapted under license by [facility] ([facility]). If you have questions about a medical condition or this instruction, always ask your healthcare professional. [place] [address] any warranty or liability for your use of this information.

## 2017-11-07 ASSESSMENT — ENCOUNTER SYMPTOMS
RHINORRHEA: 0
WHEEZING: 0
CONSTIPATION: 0
BACK PAIN: 0
DIARRHEA: 0
ABDOMINAL PAIN: 0
SORE THROAT: 0
NAUSEA: 0
VOMITING: 0
COUGH: 0
SHORTNESS OF BREATH: 0

## 2017-11-07 NOTE — PROGRESS NOTES
Procedure Laterality Date    CARDIAC CATHETERIZATION  2009??    normal results Flores Silver  [id number]    Dr. Rogers Orozco in 5 years   [address]  2002??    bilateral ctaracts    LUMBAR SPINE SURGERY  09/12/2013    L4-S1 decompression L5-S1 posterior fusion , greater trochanteric injection;rt.  OTHER SURGICAL HISTORY  01/2013    excision of posterior neck cyst    OTHER SURGICAL HISTORY  09/2017    link placed   Dimas Kaplan 59      as a child    UPPER GASTROINTESTINAL ENDOSCOPY  2016     Family History   Problem Relation Age of Onset    Heart Disease Father     Cancer Father     Breast Cancer Father     Heart Disease Mother     Kidney Disease Brother     Heart Disease Brother     Prostate Cancer Brother     Cancer Brother      prostate    Heart Disease Brother     Kidney Disease Brother     Diabetes Neg Hx      Social History   Substance Use Topics    Smoking status: Current Every Day Smoker     Years: 30.00     Types: Cigars     Last attempt to quit: 1/1/1986    Smokeless tobacco: Never Used      Comment: 3/4 cigars  a day   don't inhale.  Printed to avs    Alcohol use Yes      Comment: rarely      Current Outpatient Prescriptions   Medication Sig Dispense Refill    vitamin B-12 (CYANOCOBALAMIN) 1000 MCG tablet Take 3,000 mcg by mouth daily      pitavastatin (LIVALO) 4 MG TABS tablet TAKE ONE TABLET BY MOUTH EVERY EVENING 90 tablet 3    rizatriptan (MAXALT) 10 MG tablet Take 1 tablet by mouth once as needed for Migraine May repeat in 2 hours if needed 30 tablet 0    metoprolol tartrate (LOPRESSOR) 25 MG tablet Take 1 tablet by mouth 2 times daily 180 tablet 3    ezetimibe (ZETIA) 10 MG tablet Take 1 tablet by mouth daily 90 tablet 3    lisinopril (PRINIVIL;ZESTRIL) 20 MG tablet Take 1 tablet by mouth daily 90 tablet 3    Ascorbic Acid (VITAMIN C) 250 MG tablet Take 250 mg by mouth daily      Multiple Vitamins-Minerals (MULTIVITAMIN PO) Take by mouth daily      gabapentin (NEURONTIN) 300 MG capsule Take 1 capsule by mouth 2 times daily  2    aspirin EC 81 MG EC tablet Take 81 mg by mouth daily. No current facility-administered medications for this visit. Allergies   Allergen Reactions    Pravastatin      Joint pain     Health Maintenance   Topic Date Due    DTaP/Tdap/Td vaccine (1 - Tdap) 07/23/1965    Lipid screen  03/13/2022    Colon cancer screen colonoscopy  06/09/2027    Zostavax vaccine  Addressed    Flu vaccine  Completed    Pneumococcal low/med risk  Completed    AAA screen  Completed    Hepatitis C screen  Completed         Objective:     Physical Exam   Constitutional: He is oriented to person, place, and time. No distress. HENT:   Mouth/Throat: Oropharynx is clear and moist. No oropharyngeal exudate. Eyes: Conjunctivae are normal.   Neck: No thyromegaly present. No carotid bruits   Cardiovascular: Normal rate, regular rhythm, normal heart sounds and intact distal pulses. No murmur heard. Pulmonary/Chest: Breath sounds normal. He has no wheezes. He has no rales. Abdominal: Soft. Bowel sounds are normal. He exhibits no distension and no mass. There is no tenderness. There is no rebound and no guarding. Musculoskeletal: Normal range of motion. He exhibits no edema or tenderness. Lymphadenopathy:     He has no cervical adenopathy. Neurological: He is alert and oriented to person, place, and time. He has normal reflexes. No cranial nerve deficit. Skin: Skin is dry. No rash noted. Psychiatric: He has a normal mood and affect. Vitals reviewed. /68 (Site: Left Arm, Position: Sitting, Cuff Size: Large Adult)   Pulse 72   Temp 97.4 °F (36.3 °C)   Resp 20   Ht 5' 11\" (1.803 m)   Wt 236 lb 11.2 oz (107.4 kg)   BMI 33.01 kg/m²       Impression/Plan:  1. Essential hypertension    2. Hyperlipidemia, unspecified hyperlipidemia type    3.  Cardiac in voice recognition technology. **       Electronically signed by Stanislaw Palmer MD on 11/7/2017 at 7:27 AM

## 2017-11-08 LAB
A/G RATIO: 1.7 (ref 1.5–2.5)
ABSOLUTE BASO #: 0 /CMM (ref 0–200)
ABSOLUTE EOS #: 100 /CMM (ref 0–500)
ABSOLUTE LYMPH #: 2300 /CMM (ref 1000–4800)
ABSOLUTE MONO #: 700 /CMM (ref 0–800)
ABSOLUTE NEUT #: 3200 /CMM (ref 1800–7700)
ALBUMIN SERPL-MCNC: 4.3 GM/DL (ref 3.5–5)
ALP BLD-CCNC: 56 IU/L (ref 41–137)
ALT SERPL-CCNC: 18 IU/L (ref 10–40)
ANION GAP SERPL CALCULATED.3IONS-SCNC: 8 MMOL/L (ref 4–12)
AST SERPL-CCNC: 19 IU/L (ref 15–41)
BASOPHILS RELATIVE PERCENT: 0.2 % (ref 0–2)
BILIRUB SERPL-MCNC: 0.4 MG/DL (ref 0.2–1)
BUN BLDV-MCNC: 11 MG/DL (ref 7–20)
CALCIUM SERPL-MCNC: 9.1 MG/DL (ref 8.8–10.5)
CHLORIDE BLD-SCNC: 107 MEQ/L (ref 101–111)
CHOLESTEROL/HDL RELATIVE RISK: 3.8 (ref 4–5)
CHOLESTEROL: 139 MG/DL
CO2: 26 MEQ/L (ref 21–32)
CREAT SERPL-MCNC: 0.92 MG/DL (ref 0.7–1.3)
CREATININE CLEARANCE: >60
DIRECT-LDL / HDL RISK: 2.7
EOSINOPHILS RELATIVE PERCENT: 1.8 % (ref 0–6)
GLUCOSE: 123 MG/DL (ref 70–110)
HCT VFR BLD CALC: 44.5 % (ref 40–49)
HDLC SERPL-MCNC: 36 MG/DL
HEMOGLOBIN: 15.3 GM/DL (ref 13.5–16.5)
LDL CHOLESTEROL DIRECT: 99 MG/DL
LYMPHOCYTES RELATIVE PERCENT: 36.4 % (ref 15–45)
MCH RBC QN AUTO: 33.8 PG (ref 27.5–33)
MCHC RBC AUTO-ENTMCNC: 34.4 GM/DL (ref 33–36)
MCV RBC AUTO: 98.3 CU MIC (ref 80–97)
MONOCYTES RELATIVE PERCENT: 10.8 % (ref 2–10)
NEUTROPHILS RELATIVE PERCENT: 50.8 % (ref 40–70)
NUCLEATED RBCS: 0.2 /100 WBC
PDW BLD-RTO: 13.4 % (ref 12–16)
PLATELET # BLD: 250 TH/CMM (ref 150–400)
POTASSIUM SERPL-SCNC: 4.6 MEQ/L (ref 3.6–5)
RBC # BLD: 4.53 MIL/CMM (ref 4.5–6)
RPR / VDRL: NONREACTIVE
SODIUM BLD-SCNC: 141 MEQ/L (ref 135–145)
TOTAL PROTEIN: 6.8 G/DL (ref 6.2–8)
TRIGL SERPL-MCNC: 150 MG/DL
VLDLC SERPL CALC-MCNC: 30 MG/DL
WBC # BLD: 6.3 TH/CMM (ref 4.4–10.5)

## 2017-11-09 ENCOUNTER — HOSPITAL ENCOUNTER (OUTPATIENT)
Dept: INPATIENT UNIT | Age: 72
Discharge: HOME | End: 2017-11-10
Attending: INTERNAL MEDICINE | Admitting: INTERNAL MEDICINE
Payer: MEDICARE

## 2017-11-09 ENCOUNTER — HOSPITAL ENCOUNTER (OUTPATIENT)
Dept: INPATIENT UNIT | Age: 72
Discharge: HOME | End: 2017-11-09

## 2017-11-09 ENCOUNTER — APPOINTMENT (OUTPATIENT)
Dept: GENERAL RADIOLOGY | Age: 72
End: 2017-11-09
Attending: INTERNAL MEDICINE
Payer: MEDICARE

## 2017-11-09 PROBLEM — Z95.0 S/P CARDIAC PACEMAKER PROCEDURE: Status: ACTIVE | Noted: 2017-11-09

## 2017-11-09 LAB
ABO: NORMAL
ALBUMIN SERPL-MCNC: 4 G/DL (ref 3.5–5.1)
ALP BLD-CCNC: 64 U/L (ref 38–126)
ALT SERPL-CCNC: 17 U/L (ref 11–66)
ANION GAP SERPL CALCULATED.3IONS-SCNC: 15 MEQ/L (ref 8–16)
ANTIBODY SCREEN: NORMAL
APTT: 30.6 SECONDS (ref 22–38)
AST SERPL-CCNC: 15 U/L (ref 5–40)
BASOPHILS # BLD: 0.3 %
BASOPHILS ABSOLUTE: 0 THOU/MM3 (ref 0–0.1)
BILIRUB SERPL-MCNC: 0.4 MG/DL (ref 0.3–1.2)
BUN BLDV-MCNC: 8 MG/DL (ref 7–22)
CALCIUM SERPL-MCNC: 9.1 MG/DL (ref 8.5–10.5)
CHLORIDE BLD-SCNC: 102 MEQ/L (ref 98–111)
CO2: 25 MEQ/L (ref 23–33)
CREAT SERPL-MCNC: 0.9 MG/DL (ref 0.4–1.2)
EKG ATRIAL RATE: 64 BPM
EKG ATRIAL RATE: 66 BPM
EKG P AXIS: -6 DEGREES
EKG P AXIS: 62 DEGREES
EKG P-R INTERVAL: 242 MS
EKG P-R INTERVAL: 296 MS
EKG Q-T INTERVAL: 406 MS
EKG Q-T INTERVAL: 406 MS
EKG QRS DURATION: 108 MS
EKG QRS DURATION: 112 MS
EKG QTC CALCULATION (BAZETT): 418 MS
EKG QTC CALCULATION (BAZETT): 425 MS
EKG R AXIS: 24 DEGREES
EKG R AXIS: 29 DEGREES
EKG T AXIS: 74 DEGREES
EKG T AXIS: 87 DEGREES
EKG VENTRICULAR RATE: 64 BPM
EKG VENTRICULAR RATE: 66 BPM
EOSINOPHIL # BLD: 1.8 %
EOSINOPHILS ABSOLUTE: 0.1 THOU/MM3 (ref 0–0.4)
GFR SERPL CREATININE-BSD FRML MDRD: 83 ML/MIN/1.73M2
GLUCOSE BLD-MCNC: 122 MG/DL (ref 70–108)
HCT VFR BLD CALC: 47.1 % (ref 42–52)
HEMOGLOBIN: 16.3 GM/DL (ref 14–18)
INR BLD: 0.91 (ref 0.85–1.13)
LYMPHOCYTES # BLD: 34.9 %
LYMPHOCYTES ABSOLUTE: 2.1 THOU/MM3 (ref 1–4.8)
MACROCYTES: ABNORMAL
MCH RBC QN AUTO: 34.4 PG (ref 27–31)
MCHC RBC AUTO-ENTMCNC: 34.6 GM/DL (ref 33–37)
MCV RBC AUTO: 99.3 FL (ref 80–94)
MONOCYTES # BLD: 9.9 %
MONOCYTES ABSOLUTE: 0.6 THOU/MM3 (ref 0.4–1.3)
NUCLEATED RED BLOOD CELLS: 0 /100 WBC
PDW BLD-RTO: 13.4 % (ref 11.5–14.5)
PLATELET # BLD: 288 THOU/MM3 (ref 130–400)
PMV BLD AUTO: 7.8 MCM (ref 7.4–10.4)
POTASSIUM SERPL-SCNC: 4.6 MEQ/L (ref 3.5–5.2)
RBC # BLD: 4.75 MILL/MM3 (ref 4.7–6.1)
RH FACTOR: NORMAL
SEG NEUTROPHILS: 53.1 %
SEGMENTED NEUTROPHILS ABSOLUTE COUNT: 3.2 THOU/MM3 (ref 1.8–7.7)
SODIUM BLD-SCNC: 142 MEQ/L (ref 135–145)
TOTAL PROTEIN: 6.8 G/DL (ref 6.1–8)
WBC # BLD: 6 THOU/MM3 (ref 4.8–10.8)

## 2017-11-09 PROCEDURE — 6360000002 HC RX W HCPCS: Performed by: INTERNAL MEDICINE

## 2017-11-09 PROCEDURE — 85025 COMPLETE CBC W/AUTO DIFF WBC: CPT

## 2017-11-09 PROCEDURE — 33208 INSRT HEART PM ATRIAL & VENT: CPT | Performed by: INTERNAL MEDICINE

## 2017-11-09 PROCEDURE — 85730 THROMBOPLASTIN TIME PARTIAL: CPT

## 2017-11-09 PROCEDURE — C1898 LEAD, PMKR, OTHER THAN TRANS: HCPCS

## 2017-11-09 PROCEDURE — 6370000000 HC RX 637 (ALT 250 FOR IP): Performed by: INTERNAL MEDICINE

## 2017-11-09 PROCEDURE — 85610 PROTHROMBIN TIME: CPT

## 2017-11-09 PROCEDURE — 36415 COLL VENOUS BLD VENIPUNCTURE: CPT

## 2017-11-09 PROCEDURE — 86901 BLOOD TYPING SEROLOGIC RH(D): CPT

## 2017-11-09 PROCEDURE — 86900 BLOOD TYPING SEROLOGIC ABO: CPT

## 2017-11-09 PROCEDURE — 2580000003 HC RX 258: Performed by: INTERNAL MEDICINE

## 2017-11-09 PROCEDURE — 6360000002 HC RX W HCPCS

## 2017-11-09 PROCEDURE — 2500000003 HC RX 250 WO HCPCS: Performed by: INTERNAL MEDICINE

## 2017-11-09 PROCEDURE — 2720000010 HC SURG SUPPLY STERILE

## 2017-11-09 PROCEDURE — 80053 COMPREHEN METABOLIC PANEL: CPT

## 2017-11-09 PROCEDURE — 93005 ELECTROCARDIOGRAM TRACING: CPT

## 2017-11-09 PROCEDURE — 2500000003 HC RX 250 WO HCPCS

## 2017-11-09 PROCEDURE — A6219 GAUZE <= 16 SQ IN W/BORDER: HCPCS

## 2017-11-09 PROCEDURE — C1894 INTRO/SHEATH, NON-LASER: HCPCS

## 2017-11-09 PROCEDURE — C1785 PMKR, DUAL, RATE-RESP: HCPCS

## 2017-11-09 PROCEDURE — 33284 HC REMOVAL OF RECORDER: CPT | Performed by: INTERNAL MEDICINE

## 2017-11-09 PROCEDURE — A4565 SLINGS: HCPCS

## 2017-11-09 PROCEDURE — 71010 XR CHEST PORTABLE: CPT

## 2017-11-09 PROCEDURE — 86850 RBC ANTIBODY SCREEN: CPT

## 2017-11-09 RX ORDER — SUMATRIPTAN 50 MG/1
50 TABLET, FILM COATED ORAL
Status: ACTIVE | OUTPATIENT
Start: 2017-11-09 | End: 2017-11-09

## 2017-11-09 RX ORDER — DOCUSATE SODIUM 100 MG/1
100 CAPSULE, LIQUID FILLED ORAL 2 TIMES DAILY
Status: DISCONTINUED | OUTPATIENT
Start: 2017-11-09 | End: 2017-11-10 | Stop reason: HOSPADM

## 2017-11-09 RX ORDER — CHLORHEXIDINE GLUCONATE 4 G/100ML
SOLUTION TOPICAL ONCE
Status: DISCONTINUED | OUTPATIENT
Start: 2017-11-09 | End: 2017-11-10 | Stop reason: HOSPADM

## 2017-11-09 RX ORDER — SODIUM CHLORIDE 0.9 % (FLUSH) 0.9 %
10 SYRINGE (ML) INJECTION EVERY 12 HOURS SCHEDULED
Status: DISCONTINUED | OUTPATIENT
Start: 2017-11-09 | End: 2017-11-10 | Stop reason: HOSPADM

## 2017-11-09 RX ORDER — SODIUM CHLORIDE 0.9 % (FLUSH) 0.9 %
10 SYRINGE (ML) INJECTION PRN
Status: DISCONTINUED | OUTPATIENT
Start: 2017-11-09 | End: 2017-11-10 | Stop reason: HOSPADM

## 2017-11-09 RX ORDER — LANOLIN ALCOHOL/MO/W.PET/CERES
3000 CREAM (GRAM) TOPICAL DAILY
Status: DISCONTINUED | OUTPATIENT
Start: 2017-11-09 | End: 2017-11-10 | Stop reason: HOSPADM

## 2017-11-09 RX ORDER — ONDANSETRON 2 MG/ML
4 INJECTION INTRAMUSCULAR; INTRAVENOUS EVERY 6 HOURS PRN
Status: DISCONTINUED | OUTPATIENT
Start: 2017-11-09 | End: 2017-11-10 | Stop reason: HOSPADM

## 2017-11-09 RX ORDER — GABAPENTIN 300 MG/1
300 CAPSULE ORAL 2 TIMES DAILY
Status: DISCONTINUED | OUTPATIENT
Start: 2017-11-09 | End: 2017-11-10 | Stop reason: HOSPADM

## 2017-11-09 RX ORDER — ACETAMINOPHEN 325 MG/1
650 TABLET ORAL EVERY 4 HOURS PRN
Status: DISCONTINUED | OUTPATIENT
Start: 2017-11-09 | End: 2017-11-10 | Stop reason: HOSPADM

## 2017-11-09 RX ORDER — EZETIMIBE 10 MG/1
10 TABLET ORAL DAILY
Status: DISCONTINUED | OUTPATIENT
Start: 2017-11-09 | End: 2017-11-10 | Stop reason: HOSPADM

## 2017-11-09 RX ORDER — ASPIRIN 81 MG/1
81 TABLET ORAL DAILY
Status: DISCONTINUED | OUTPATIENT
Start: 2017-11-09 | End: 2017-11-10 | Stop reason: HOSPADM

## 2017-11-09 RX ORDER — SODIUM CHLORIDE 9 MG/ML
INJECTION, SOLUTION INTRAVENOUS CONTINUOUS
Status: DISCONTINUED | OUTPATIENT
Start: 2017-11-09 | End: 2017-11-10 | Stop reason: HOSPADM

## 2017-11-09 RX ORDER — LISINOPRIL 20 MG/1
20 TABLET ORAL DAILY
Status: DISCONTINUED | OUTPATIENT
Start: 2017-11-09 | End: 2017-11-10 | Stop reason: HOSPADM

## 2017-11-09 RX ADMIN — SODIUM CHLORIDE: 9 INJECTION, SOLUTION INTRAVENOUS at 06:47

## 2017-11-09 RX ADMIN — SODIUM CHLORIDE 50000 UNITS: 0.9 IRRIGANT IRRIGATION at 09:51

## 2017-11-09 RX ADMIN — EZETIMIBE 10 MG: 10 TABLET ORAL at 16:20

## 2017-11-09 RX ADMIN — ACETAMINOPHEN 650 MG: 325 TABLET ORAL at 14:29

## 2017-11-09 RX ADMIN — Medication 25 MG: at 20:54

## 2017-11-09 RX ADMIN — CYANOCOBALAMIN TAB 1000 MCG 3000 MCG: 1000 TAB at 16:21

## 2017-11-09 RX ADMIN — Medication 10 ML: at 20:55

## 2017-11-09 RX ADMIN — DOCUSATE SODIUM 100 MG: 100 CAPSULE ORAL at 20:52

## 2017-11-09 RX ADMIN — GABAPENTIN 300 MG: 300 CAPSULE ORAL at 20:52

## 2017-11-09 RX ADMIN — WATER 2 G: 1 INJECTION INTRAMUSCULAR; INTRAVENOUS; SUBCUTANEOUS at 09:52

## 2017-11-09 RX ADMIN — Medication 10 ML: at 15:09

## 2017-11-09 RX ADMIN — LISINOPRIL 20 MG: 20 TABLET ORAL at 16:20

## 2017-11-09 RX ADMIN — ASPIRIN 81 MG: 81 TABLET ORAL at 16:20

## 2017-11-09 RX ADMIN — WATER 2 G: 1 INJECTION INTRAMUSCULAR; INTRAVENOUS; SUBCUTANEOUS at 16:23

## 2017-11-09 ASSESSMENT — PAIN SCALES - GENERAL
PAINLEVEL_OUTOF10: 0
PAINLEVEL_OUTOF10: 4
PAINLEVEL_OUTOF10: 0

## 2017-11-09 NOTE — H&P
[address] [place] [address]  Sedation/Analgesia History & Physical    Pt Name: Dillan Zavala  MRN: [id number]  YOB: 1945  Provider Performing Procedure: Derrek Dalton  Primary Care Physician: Calvin Honeycutt MD    PRE-PROCEDURE   DNR-CCA/DNR-CC []Yes [x]No  Brief History/Pre-Procedure Diagnosis: Presyncope     CONSENT  I have discussed with the patient and or the patient representative the indication, alternatives, and the possible risk and/ or complications of the planned procedure and the anesthesia or conscious sedation methods. The patient and or representative appear to understand and agree to proceed. I have discussed with the patient risks, benefits, and alternatives (and relevant risks, benefits, and side effects related to alternatives or not receiving care), and likelihood of the success. MEDICAL HISTORY        has a past medical history of Cancer ([facility] [address] Diabetes mellitus ([facility] [address] Diverticulosis; Erectile dysfunction; GERD (gastroesophageal reflux disease); Headache(784.0); Hyperlipidemia; Hypertension; Migraine; and Obesity (BMI 30-39.9). SURGICAL HISTORY   has a past surgical history that includes Tonsillectomy; eye surgery (2002? ? ); other surgical history (01/2013); Lumbar spine surgery (09/12/2013); Upper gastrointestinal endoscopy (2016); Colonoscopy ([id number],[id number],5587); Cardiac catheterization (2009??); other surgical history (09/2017); and ostate surgery (1998).   Additional information:       ALLERGIES   Allergies as of 11/09/2017 - Review Complete 11/09/2017   Allergen Reaction Noted    Pravastatin  12/03/2014     Additional information:       MEDICATIONS   Coumadin Use Last 7 Days [x]No []Yes  Antiplatelet drug therapy use last 7 days  []No [x]Yes  Other anticoagulant use last 7 days  [x]No []Yes      Current Facility-Administered Medications:     0.9 % sodium chloride infusion, , Intravenous, Continuous, Derrek Dalton MD, Last Rate: 75 mL/hr at 11/09/17 PROCEDURE   []NAYELI  [x]Pacemaker/ICD []Cardioversion  []Cath  []PCI   [x]LOOP RECORDER INSERTION OR REMOVAL  []Peripheral angiography      SEDATION  Planned agent:[x]Midazolam []Meperidine [x]Sublimaze []Morphine  []Diazepam  []Other:     ASA Classification:  []1 []2 [x]3 []4 []5  Class 1: A normal healthy patient  Class 2: Pt with mild to moderate systemic disease  Class 3: Severe systemic disease or disturbance  Class 4: Severe systemic disorders that are already life threatening. Class 5: Moribund pt with little chances of survival, for more than 24 hours. Mallampati I Airway Classification:   []1 []2 [x]3 []4    [x]Pre-procedure diagnostic studies complete and results available. Comment:    [x]Previous sedation/anesthesia experiences assessed. Comment:    [x]The patient is an appropriate candidate to undergo the planned procedure sedation and anesthesia. (Refer to nursing sedation/analgesia documentation record)  [x]Formulation and discussion of sedation/procedure plan, risks, and expectations with patient and/or responsible adult completed. [x]Patient examined immediately prior to the procedure. (Refer to nursing sedation/analgesia documentation record)  All the risks and limitations of pacemaker implantation and management were explained in details to the patient and family. Bleeding, immediate and long-term infection, cardiac artrhythmias, cardiac perforation, pneumothorax, vein thrombosis and possible occlusion, chronic pain and discomfort, lead dislodgement, and even death, were explained among other potential risks. The patient and family expressed full understanding and agreed to proceed.     Selene Sun  Electronically signed 11/9/2017 at 7:19 AM

## 2017-11-09 NOTE — PLAN OF CARE
Problem: Discharge Planning:  Goal: Ability to perform activities of daily living will improve  Ability to perform activities of daily living will improve   Care plan reviewed with patient and spouse. Patient and spouse verbalize understanding of the plan of care and contribute to goal setting.

## 2017-11-09 NOTE — PROGRESS NOTES
Patient transferred to [address] [place] [address] room 27 per wheelchair in stable condition per nurse. Belongings with Patient upon transfer.

## 2017-11-09 NOTE — BRIEF OP NOTE
Brief Postoperative Note    Silva Gutierrez  1945  [id number]    Pre-operative Diagnosis: Pre-syncope. Symptomatic bradycardia    Post-operative Diagnosis: Same    Procedure: Pacemaker implant.  Loop recorder removal.    Anesthesia: Local and Moderate Sedation    Surgeons/Assistants: Selene Sun MD, [facility]    Estimated Blood Loss: less than 50     Complications: None    Specimens: Was Not Obtained    Findings: Pacemaker implant and loop recorder removal.    Electronically signed by Selene Sun MD on 11/9/2017 at 9:17 AM

## 2017-11-10 VITALS
SYSTOLIC BLOOD PRESSURE: 117 MMHG | HEART RATE: 71 BPM | HEIGHT: 72 IN | WEIGHT: 250.6 LBS | DIASTOLIC BLOOD PRESSURE: 65 MMHG | RESPIRATION RATE: 16 BRPM | TEMPERATURE: 97.4 F | BODY MASS INDEX: 33.94 KG/M2 | OXYGEN SATURATION: 94 %

## 2017-11-10 PROCEDURE — 6370000000 HC RX 637 (ALT 250 FOR IP): Performed by: INTERNAL MEDICINE

## 2017-11-10 PROCEDURE — 96374 THER/PROPH/DIAG INJ IV PUSH: CPT

## 2017-11-10 PROCEDURE — 2580000003 HC RX 258: Performed by: INTERNAL MEDICINE

## 2017-11-10 PROCEDURE — 6360000002 HC RX W HCPCS: Performed by: INTERNAL MEDICINE

## 2017-11-10 PROCEDURE — 99024 POSTOP FOLLOW-UP VISIT: CPT | Performed by: PHYSICIAN ASSISTANT

## 2017-11-10 RX ORDER — CEPHALEXIN 500 MG/1
500 CAPSULE ORAL 3 TIMES DAILY
Qty: 15 CAPSULE | Refills: 0 | Status: SHIPPED | OUTPATIENT
Start: 2017-11-10 | End: 2018-06-11 | Stop reason: ALTCHOICE

## 2017-11-10 RX ADMIN — WATER 2 G: 1 INJECTION INTRAMUSCULAR; INTRAVENOUS; SUBCUTANEOUS at 00:55

## 2017-11-10 RX ADMIN — CYANOCOBALAMIN TAB 1000 MCG 3000 MCG: 1000 TAB at 08:21

## 2017-11-10 RX ADMIN — LISINOPRIL 20 MG: 20 TABLET ORAL at 08:21

## 2017-11-10 RX ADMIN — EZETIMIBE 10 MG: 10 TABLET ORAL at 08:20

## 2017-11-10 RX ADMIN — ASPIRIN 81 MG: 81 TABLET ORAL at 08:19

## 2017-11-10 RX ADMIN — Medication 25 MG: at 08:21

## 2017-11-10 RX ADMIN — GABAPENTIN 300 MG: 300 CAPSULE ORAL at 08:20

## 2017-11-10 ASSESSMENT — PAIN DESCRIPTION - FREQUENCY: FREQUENCY: CONTINUOUS

## 2017-11-10 ASSESSMENT — PAIN DESCRIPTION - LOCATION: LOCATION: CHEST

## 2017-11-10 ASSESSMENT — PAIN DESCRIPTION - PAIN TYPE: TYPE: SURGICAL PAIN

## 2017-11-10 ASSESSMENT — PAIN DESCRIPTION - DESCRIPTORS: DESCRIPTORS: ACHING

## 2017-11-10 ASSESSMENT — PAIN SCALES - GENERAL: PAINLEVEL_OUTOF10: 5

## 2017-11-10 ASSESSMENT — PAIN DESCRIPTION - PROGRESSION: CLINICAL_PROGRESSION: NOT CHANGED

## 2017-11-10 ASSESSMENT — PAIN DESCRIPTION - ONSET: ONSET: ON-GOING

## 2017-11-10 ASSESSMENT — PAIN DESCRIPTION - ORIENTATION: ORIENTATION: LEFT;UPPER

## 2017-11-10 NOTE — DISCHARGE SUMMARY
Cardiology Discharge Summary      Patient Identification:  Mercy Hardin  : 1945  MRN: [id number]   Account: [de-identified]     Admit date: 2017  Discharge date: 11/10/2017   Attending provider: Suzy Yu MD        Primary care provider: Coco Costa MD     Admission Diagnoses:  Near-syncope  Intermittent complete heart block  Intermittent pauses        Discharge Diagnoses: Active Problems:    S/P cardiac pacemaker procedure: 2017: Medtronic Dual Chamber  Same       Hospital Course:   Mercy Hardin is a 71 y.o. male admitted to [address] [place] [address] on 2017 for Insertion of pacemaker and removal of loop recorder. .        Patient's loop recorder revealed that he was having intermittent episodes of complete heart block and long pauses up to 3.6 seconds. It subsequently was decided that he would undergo pacemaker insertion. Permanent pacemaker was placed and he tolerated it well. The following day the device was checked and was working well. His site was stable. He did not have any obvious bleeding or hematoma. He was then discharged home in stable condition.     Procedures: 2017  Insertion of permanent pacemaker, removal of loop recorder  Code Status: Full Code     Consults:   none    Examination:  Vitals:/65   Pulse 71   Temp 97.4 °F (36.3 °C)   Resp 16   Ht 6' (1.829 m)   Wt 250 lb 9.6 oz (113.7 kg)   SpO2 94%   BMI 33.99 kg/m²      24 hour intake/output:  Intake/Output Summary (Last 24 hours) at 11/10/17 0841  Last data filed at 11/10/17 0501   Gross per 24 hour   Intake              560 ml   Output                1 ml   Net              559 ml       General Appearance: alert and oriented to person, place and time, in no acute distress  Cardiovascular: normal rate, regular rhythm, normal S1 and S2, no murmurs, rubs, clicks, or gallops, distal pulses intact, no carotid bruits, no COMPARISON: Chest radiograph, 28 April 2017. TECHNIQUE: Standard portable AP view of the chest provided, timed at 1018 hours. FINDINGS: The lung volumes are slightly increased on both sides, though they remain at the lower limits of normal. There are no convincing focal alveolar consolidations of either lung. There are diffusely prominent interstitial characteristics through both lungs, similar to the prior exam. There is no pneumothorax or pleural effusion. There has been interval insertion of a dual lead cardiac pacing device. Leads approach through the left subclavian vein, and, to the limits of this technique, appear satisfactory. Generator is positioned over the anterior left chest, grossly acceptable. There are surgical clips in the left para midline chest.     GROSSLY SATISFACTORY POSITIONING OF THE CARDIAC PACING DEVICE THAT HAS BEEN INSERTED IN THE INTERVAL. NO EVIDENCE OF PNEUMOTHORAX. STABLE CHRONIC FINDINGS OTHERWISE, AS DISCUSSED. **This report has been created using voice recognition software. It may contain minor errors which are inherent in voice recognition technology. ** Final report electronically signed by Dr. Jada Ochoa on 11/9/2017 12:44 PM      Labs:   Recent Results (from the past 72 hour(s))   CBC    Collection Time: 11/08/17  7:10 AM   Result Value Ref Range    WBC 6.3 4.4 - 10.5 th/cmm    RBC 4.53 4.50 - 6.00 mil/cmm    Hemoglobin 15.3 13.5 - 16.5 gm/dL    Hematocrit 44.5 40.0 - 49.0 %    MCV 98.3 (H) 80 - 97 CU VOLODYMYR    MCH 33.8 (H) 27.5 - 33.0 PG    MCHC 34.4 33.0 - 36.0 gm/dL    RDW 13.4 12.0 - 16.0 %    Platelets 108 945 - 990 th/cmm    Neutrophils % 50.8 40 - 70 %    Lymphocytes % 36.4 15 - 45 %    Monocytes % 10.8 (H) 2 - 10 %    Eosinophils % 1.8 0 - 6 %    Basophils % 0.2 0 - 2 %    Nucleated RBCs 0.2 <1 /100 WBC    Absolute Neut # 3200 1800 - 7700 /cmm    Absolute Lymph # 2300 1000 - 4800 /cmm    Absolute Mono # 700 0 - 800 /cmm    Absolute Eos # 100 0 - 500 /cmm    Absolute Baso # 0 0 - 200 /cmm   RPR Reflex to Titer and TPPA    Collection Time: 11/08/17  7:10 AM   Result Value Ref Range    RPR / VDRL NonReactive NonReactiv   Comprehensive Metabolic Panel    Collection Time: 11/08/17  7:10 AM   Result Value Ref Range    Sodium 141 135 - 145 mEq/L    Potassium 4.6 3.6 - 5.0 mEq/L    Chloride 107 101 - 111 mEq/L    CO2 26 21 - 32 mEq/L    Anion Gap 8 4 - 12    Glucose 123 (H) 70 - 110 mg/dL    BUN 11 7 - 20 mg/dL    CREATININE 0.92 0.70 - 1.30 mg/dL    Creatinine Clearance >60     AST 19 15 - 41 IU/L    Alkaline Phosphatase 56 41 - 137 IU/L    Total Bilirubin 0.4 0.2 - 1.0 mg/dL    Calcium 9.1 8.8 - 10.5 mg/dL    Alb 4.3 3.5 - 5.0 gm/dL    Total Protein 6.8 6.2 - 8.0 g/dL    Albumin/Globulin Ratio 1.7 1.5 - 2.5    ALT 18 10 - 40 IU/L   Lipid Panel    Collection Time: 11/08/17  7:10 AM   Result Value Ref Range    LDL Direct 99 mg/dl    Cholesterol 139 <200 mg/dL    Triglycerides 150 <150 mg/dL    HDL 36 (L) mg/dL    CHOLESTEROL/HDL RELATIVE RISK 3.8 (L) 4.0 - 5.0    Direct-LDL / HDL Risk 2.7 <3.1    VLDL 30 <39 mg/dL   APTT    Collection Time: 11/09/17  6:03 AM   Result Value Ref Range    aPTT 30.6 22.0 - 38.0 seconds   CBC auto differential    Collection Time: 11/09/17  6:03 AM   Result Value Ref Range    WBC 6.0 4.8 - 10.8 thou/mm3    RBC 4.75 4.70 - 6.10 mill/mm3    Hemoglobin 16.3 14.0 - 18.0 gm/dl    Hematocrit 47.1 42.0 - 52.0 %    MCV 99.3 (H) 80.0 - 94.0 fL    MCH 34.4 (H) 27.0 - 31.0 pg    MCHC 34.6 33.0 - 37.0 gm/dl    RDW 13.4 11.5 - 14.5 %    Platelets 842 978 - 317 thou/mm3    MPV 7.8 7.4 - 10.4 mcm    Seg Neutrophils 53.1 %    Lymphocytes 34.9 %    Monocytes 9.9 %    Eosinophils 1.8 %    Basophils 0.3 %    nRBC 0 /100 wbc    Macrocytes 1+ Absent    Segs Absolute 3.2 1.8 - 7.7 thou/mm3    Lymphocytes # 2.1 1.0 - 4.8 thou/mm3    Monocytes # 0.6 0.4 - 1.3 thou/mm3    Eosinophils # 0.1 0.0 - 0.4 thou/mm3    Basophils # 0.0 0.0 - 0.1 thou/mm3   Comprehensive metabolic panel discharge: 20 minutes        If patient experiences reoccurrences of symptoms such as chest pain, shortness of breath, lower extremity edema, >3 lb weight gain, syncope, near syncope, or any other concerns, seek medical advise ASAP or return to ER for evaluation.     Electronically signed by Bethany Caro PA-C on 11/10/2017 at 8:41 AM

## 2017-11-10 NOTE — PLAN OF CARE
Problem: Falls - Risk of  Goal: Absence of falls  Outcome: Met This Shift  Patient uses call light before ambulating and when in need of assistance. No falls during shift, at this time. Arm band & falling star in place. Pt expressed understanding of fall precautions. Hourly rounding performed. Problem: Discharge Planning:  Goal: Absence of nausea/vomiting  Absence of nausea/vomiting   Outcome: Met This Shift  No N/V this shift, at this time. Zofran available prn. Patient plans to go home at discharge. Problem: Infection - Risk of, Surgical Site:  Goal: Demonstration of wound healing without infection will improve  Demonstration of wound healing without infection will improve   Outcome: Met This Shift  No signs or symptoms of infection this shift, at this time. Patient remains afebrile. Assessments q 4 hrs. Problem: Safety:  Goal: Free from accidental physical injury  Free from accidental physical injury  Outcome: Met This Shift  No accidental injuries this shift, at this time. Call light and tray table within reach, bed locked in lowest position, patient uses call light when in need of assistance. Hourly rounding performed. Care plan reviewed with patient. Patient verbalizes understanding of the plan of care and contributes to goal setting.

## 2017-11-10 NOTE — PROGRESS NOTES
Discharge instructions given with teach back methods. Follow up appointments made. New medications and side effects given to patient. Patient understand all information verbally.

## 2017-11-13 NOTE — PROCEDURES
950 ohms, and  pacing threshold was 1.2 volts at 0.5 msec. The loop recorder was implanted in the left precordial area and the skin  was numbed using 2% lidocaine at the site of previous incision. A small  incision was created and blunt dissection was used to explant previously  implanted loop recorder. Staples were used to close the skin at the loop  explantation site. The skin was closed in 3 layers used using 2-0 Vicryl and Steri-Strips were  applied at the end. The patient tolerated procedure well and was transferred to  room in stable  condition. CONCLUSION:  1. Successful dual chamber pacemaker implantation using a Medtronic device  as described above. 2.  Successful explantation of a loop recorder as described above. Ian Maurice M.D.    D: 11/11/2017 18:12:30       T: 11/11/2017 19:31:51     [email or web address]  Job#: [id number]     Doc#: [id number]    CC:  Alli Randhawa M.D.

## 2017-11-14 ENCOUNTER — OFFICE VISIT (OUTPATIENT)
Dept: FAMILY MEDICINE CLINIC | Age: 72
End: 2017-11-14
Payer: MEDICARE

## 2017-11-14 VITALS
TEMPERATURE: 97.4 F | DIASTOLIC BLOOD PRESSURE: 62 MMHG | HEART RATE: 68 BPM | BODY MASS INDEX: 34.39 KG/M2 | WEIGHT: 253.6 LBS | RESPIRATION RATE: 24 BRPM | SYSTOLIC BLOOD PRESSURE: 110 MMHG

## 2017-11-14 DIAGNOSIS — E78.5 HYPERLIPIDEMIA, UNSPECIFIED HYPERLIPIDEMIA TYPE: Chronic | ICD-10-CM

## 2017-11-14 DIAGNOSIS — Z95.0 S/P CARDIAC PACEMAKER PROCEDURE: Primary | ICD-10-CM

## 2017-11-14 PROCEDURE — 1123F ACP DISCUSS/DSCN MKR DOCD: CPT | Performed by: FAMILY MEDICINE

## 2017-11-14 PROCEDURE — 99213 OFFICE O/P EST LOW 20 MIN: CPT | Performed by: FAMILY MEDICINE

## 2017-11-14 PROCEDURE — 3017F COLORECTAL CA SCREEN DOC REV: CPT | Performed by: FAMILY MEDICINE

## 2017-11-14 PROCEDURE — G8484 FLU IMMUNIZE NO ADMIN: HCPCS | Performed by: FAMILY MEDICINE

## 2017-11-14 PROCEDURE — 4004F PT TOBACCO SCREEN RCVD TLK: CPT | Performed by: FAMILY MEDICINE

## 2017-11-14 PROCEDURE — 4040F PNEUMOC VAC/ADMIN/RCVD: CPT | Performed by: FAMILY MEDICINE

## 2017-11-14 PROCEDURE — G8427 DOCREV CUR MEDS BY ELIG CLIN: HCPCS | Performed by: FAMILY MEDICINE

## 2017-11-14 PROCEDURE — G8417 CALC BMI ABV UP PARAM F/U: HCPCS | Performed by: FAMILY MEDICINE

## 2017-11-14 ASSESSMENT — ENCOUNTER SYMPTOMS
RHINORRHEA: 0
EYE PAIN: 0
ABDOMINAL PAIN: 0
SORE THROAT: 0
COUGH: 0
SHORTNESS OF BREATH: 0
ABDOMINAL DISTENTION: 0
DIARRHEA: 0
SINUS PRESSURE: 0
NAUSEA: 0
CONSTIPATION: 0

## 2017-11-14 ASSESSMENT — PATIENT HEALTH QUESTIONNAIRE - PHQ9
SUM OF ALL RESPONSES TO PHQ9 QUESTIONS 1 & 2: 0
SUM OF ALL RESPONSES TO PHQ QUESTIONS 1-9: 0
1. LITTLE INTEREST OR PLEASURE IN DOING THINGS: 0
2. FEELING DOWN, DEPRESSED OR HOPELESS: 0

## 2017-11-14 NOTE — PROGRESS NOTES
Ana Cristina Fish  [phone]    Dr. Eric Phoenix in 5 years   [address]  2002??    bilateral ctaracts    LUMBAR SPINE SURGERY  09/12/2013    L4-S1 decompression L5-S1 posterior fusion , greater trochanteric injection;rt.  OTHER SURGICAL HISTORY  01/2013    excision of posterior neck cyst    OTHER SURGICAL HISTORY  09/2017    link placed   Dimas Wray      as a child    UPPER GASTROINTESTINAL ENDOSCOPY  2016     Family History   Problem Relation Age of Onset    Heart Disease Father     Cancer Father     Breast Cancer Father     Heart Disease Mother     Kidney Disease Brother     Heart Disease Brother     Prostate Cancer Brother     Cancer Brother      prostate    Heart Disease Brother     Kidney Disease Brother     Diabetes Neg Hx      Social History   Substance Use Topics    Smoking status: Current Every Day Smoker     Years: 30.00     Types: Cigars     Last attempt to quit: 1/1/1986    Smokeless tobacco: Never Used      Comment: 3/4 cigars  a day   don't inhale. Printed to avs    Alcohol use Yes      Comment: rarely      Current Outpatient Prescriptions   Medication Sig Dispense Refill    cephALEXin (KEFLEX) 500 MG capsule Take 1 capsule by mouth 3 times daily 15 capsule 0    vitamin B-12 (CYANOCOBALAMIN) 1000 MCG tablet Take 3,000 mcg by mouth daily      rizatriptan (MAXALT) 10 MG tablet Take 1 tablet by mouth once as needed for Migraine May repeat in 2 hours if needed 30 tablet 0    metoprolol tartrate (LOPRESSOR) 25 MG tablet Take 1 tablet by mouth 2 times daily 180 tablet 3    ezetimibe (ZETIA) 10 MG tablet Take 1 tablet by mouth daily 90 tablet 3    lisinopril (PRINIVIL;ZESTRIL) 20 MG tablet Take 1 tablet by mouth daily 90 tablet 3    gabapentin (NEURONTIN) 300 MG capsule Take 1 capsule by mouth 2 times daily  2    aspirin EC 81 MG EC tablet Take 81 mg by mouth daily.         pitavastatin (LIVALO) 4 MG TABS tablet TAKE ONE

## 2017-11-15 ENCOUNTER — NURSE ONLY (OUTPATIENT)
Dept: CARDIOLOGY CLINIC | Age: 72
End: 2017-11-15
Payer: MEDICARE

## 2017-11-15 ENCOUNTER — OFFICE VISIT (OUTPATIENT)
Dept: CARDIOLOGY CLINIC | Age: 72
End: 2017-11-15
Payer: MEDICARE

## 2017-11-15 VITALS
BODY MASS INDEX: 34.24 KG/M2 | SYSTOLIC BLOOD PRESSURE: 128 MMHG | HEART RATE: 68 BPM | DIASTOLIC BLOOD PRESSURE: 76 MMHG | WEIGHT: 252.8 LBS | HEIGHT: 72 IN

## 2017-11-15 DIAGNOSIS — E78.5 HYPERLIPIDEMIA, UNSPECIFIED HYPERLIPIDEMIA TYPE: Chronic | ICD-10-CM

## 2017-11-15 DIAGNOSIS — R55 NEAR SYNCOPE: ICD-10-CM

## 2017-11-15 DIAGNOSIS — I10 ESSENTIAL HYPERTENSION: Chronic | ICD-10-CM

## 2017-11-15 DIAGNOSIS — Z95.0 S/P CARDIAC PACEMAKER PROCEDURE: Primary | ICD-10-CM

## 2017-11-15 PROCEDURE — 4040F PNEUMOC VAC/ADMIN/RCVD: CPT | Performed by: INTERNAL MEDICINE

## 2017-11-15 PROCEDURE — G8427 DOCREV CUR MEDS BY ELIG CLIN: HCPCS | Performed by: INTERNAL MEDICINE

## 2017-11-15 PROCEDURE — 3017F COLORECTAL CA SCREEN DOC REV: CPT | Performed by: INTERNAL MEDICINE

## 2017-11-15 PROCEDURE — 4004F PT TOBACCO SCREEN RCVD TLK: CPT | Performed by: INTERNAL MEDICINE

## 2017-11-15 PROCEDURE — G8417 CALC BMI ABV UP PARAM F/U: HCPCS | Performed by: INTERNAL MEDICINE

## 2017-11-15 PROCEDURE — 99212 OFFICE O/P EST SF 10 MIN: CPT | Performed by: INTERNAL MEDICINE

## 2017-11-15 PROCEDURE — G8484 FLU IMMUNIZE NO ADMIN: HCPCS | Performed by: INTERNAL MEDICINE

## 2017-11-15 PROCEDURE — 1123F ACP DISCUSS/DSCN MKR DOCD: CPT | Performed by: INTERNAL MEDICINE

## 2017-11-15 PROCEDURE — 93280 PM DEVICE PROGR EVAL DUAL: CPT | Performed by: INTERNAL MEDICINE

## 2017-11-15 ASSESSMENT — ENCOUNTER SYMPTOMS
RESPIRATORY NEGATIVE: 1
GASTROINTESTINAL NEGATIVE: 1
EYES NEGATIVE: 1

## 2017-11-15 NOTE — PROGRESS NOTES
on the left side. Posterior tibial pulses are 2+ on the right side, and 2+ on the left side. Pulmonary/Chest: Effort normal and breath sounds normal.   Abdominal: Soft. Bowel sounds are normal. He exhibits no mass. There is no tenderness. Musculoskeletal: He exhibits no edema. Lymphadenopathy:     He has no cervical adenopathy. Neurological: He is alert and oriented to person, place, and time. He has normal reflexes. No cranial nerve deficit. Skin: Skin is warm. No rash noted. Psychiatric: He has a normal mood and affect.        /76   Pulse 68   Ht 6' (1.829 m)   Wt 252 lb 12.8 oz (114.7 kg)   BMI 34.29 kg/m²   Wt Readings from Last 3 Encounters:   11/15/17 252 lb 12.8 oz (114.7 kg)   11/14/17 253 lb 9.6 oz (115 kg)   11/10/17 250 lb 9.6 oz (113.7 kg)     BP Readings from Last 3 Encounters:   11/15/17 128/76   11/14/17 110/62   11/10/17 117/65       Lab Data  CBC:   Lab Results   Component Value Date    WBC 6.0 11/09/2017    RBC 4.75 11/09/2017    HGB 16.3 11/09/2017    HGB 15.3 11/08/2017    HGB 12.9 04/29/2017    HCT 47.1 11/09/2017    MCV 99.3 11/09/2017    MCH 34.4 11/09/2017    MCHC 34.6 11/09/2017    RDW 13.4 11/09/2017     11/09/2017     11/08/2017     04/29/2017    MPV 7.8 11/09/2017     CMP:    Lab Results   Component Value Date     11/09/2017    K 4.6 11/09/2017     11/09/2017    CO2 25 11/09/2017    BUN 8 11/09/2017    CREATININE 0.9 11/09/2017    CREATININE 0.92 11/08/2017    CREATININE 0.8 04/29/2017    AGRATIO 1.7 11/08/2017    LABGLOM 83 11/09/2017    GLUCOSE 122 11/09/2017    GLUCOSE 123 11/08/2017    PROT 6.8 11/09/2017    LABALBU 4.0 11/09/2017    CALCIUM 9.1 11/09/2017    BILITOT 0.4 11/09/2017    ALKPHOS 64 11/09/2017    AST 15 11/09/2017    ALT 17 11/09/2017     Hepatic Function Panel:    Lab Results   Component Value Date    ALKPHOS 64 11/09/2017    ALT 17 11/09/2017    AST 15 11/09/2017    PROT 6.8 11/09/2017    BILITOT 0.4 11/09/2017    BILIDIR <0.2 04/28/2017    LABALBU 4.0 11/09/2017     Magnesium:  No results found for: MG  PT/INR:    Lab Results   Component Value Date    INR 0.91 11/09/2017    INR 0.91 09/05/2017    INR 0.77 08/20/2013     HgBA1c:  No results found for: LABA1C  FLP:    Lab Results   Component Value Date    TRIG 150 11/08/2017    TRIG 146 03/13/2017    TRIG 337 11/10/2016    HDL 36 11/08/2017    HDL 47 03/13/2017    HDL 39 11/10/2016    HDL 37 07/07/2011    LDLCALC 122 11/26/2013    LDLCALC 64 12/28/2012    LDLCALC 97 07/03/2012    LDLDIRECT 99 11/08/2017    LDLDIRECT 106 03/13/2017    LDLDIRECT 181 11/10/2016     TSH:    Lab Results   Component Value Date    TSH 2.57 11/10/2016     No results for input(s): CKTOTAL, CKMB, CKMBINDEX, TROPONINI in the last 72 hours. Assessment:   Mr. Denver West is a 71 y.o. who is known to us with history of loop recorder implantation who has been having syncopal episodes without explanation. He has had a loop recorder which showed long pause of 3.6 seconds due to complete heart block. I had a long discussion with the patient and his wife about the symptoms and findings. They have been very concerned about his symptoms, and he does not want another episode of syncope. He is an active person ands he drives. The following diagnoses were addressed during this visit:  1. S/P cardiac pacemaker procedure: 11/9/2017: Medtronic Dual Chamber     2. Essential hypertension     3. Hyperlipidemia, unspecified hyperlipidemia type     4. Near syncope           Plan:     The patient is doing fine following pacer implantation. The site is healing nicely. I will remove the staples, and let the steri-strips fall off on their own. He will follow up with Dr. Yannick Baer in February and I will se him as needed. Orders Placed:  No orders of the defined types were placed in this encounter. Medications:  No orders of the defined types were placed in this encounter.       Discussed use, benefit, and side effects of prescribed medications. All patient questions answered. Pt voiced understanding. Instructed to continue current medications, diet and exercise. Continue risk factor modification and medical management. Patient agreed with treatment plan. Follow up as directed. The patient will be seen in February by Dr. Cierra Trinidad for re-evaluation or sooner if he develops new symptoms. In the mean time, the patient will follow up with Glory Severs, MD as scheduled.

## 2017-11-15 NOTE — PROGRESS NOTES
Patient here after pacer implant on 11-9-17. Patient complains of SOB on exertion. Patient denies chest pain. Palpitations. ALFREDA.

## 2017-11-29 ENCOUNTER — TELEPHONE (OUTPATIENT)
Dept: FAMILY MEDICINE CLINIC | Age: 72
End: 2017-11-29

## 2017-11-29 DIAGNOSIS — E78.5 HYPERLIPIDEMIA, UNSPECIFIED HYPERLIPIDEMIA TYPE: Primary | ICD-10-CM

## 2017-11-29 DIAGNOSIS — Z00.00 ROUTINE GENERAL MEDICAL EXAMINATION AT A HEALTH CARE FACILITY: ICD-10-CM

## 2017-11-30 NOTE — TELEPHONE ENCOUNTER
Called and left a VM with the patients wife updating her that Christiane Nolan is requiring her and the patient to have a TSH drawn and that the order was placed in the mail to their home. Will forward to Christiane Nloan the results when they come in.

## 2017-11-30 NOTE — TELEPHONE ENCOUNTER
Latricia Walker from [facility] called back stating pt will need TSH (even if insurance does not cover it, then he will have to pay out of pocket or this will delay the admission process)hepatic panel, and PPD. [id number] Debra Chou per OLIVERIO. O.. Pt's wife was informed. Pt scheduled for PPD on 12/4/17 at 9:15. Wife will  lab orders at the  later today.

## 2017-12-04 ENCOUNTER — NURSE ONLY (OUTPATIENT)
Dept: FAMILY MEDICINE CLINIC | Age: 72
End: 2017-12-04
Payer: MEDICARE

## 2017-12-04 DIAGNOSIS — Z23 NEED FOR TUBERCULOSIS VACCINATION: Primary | ICD-10-CM

## 2017-12-04 LAB
ALBUMIN SERPL-MCNC: 4.6 GM/DL (ref 3.5–5)
ALP BLD-CCNC: 51 IU/L (ref 41–137)
ALT SERPL-CCNC: 18 IU/L (ref 10–40)
AST SERPL-CCNC: 20 IU/L (ref 15–41)
BILIRUB SERPL-MCNC: 0.8 MG/DL (ref 0.2–1)
BILIRUBIN DIRECT: 0.1 MG/DL (ref 0.1–0.2)
TOTAL PROTEIN: 6.7 G/DL (ref 6.2–8)
TSH SERPL DL<=0.05 MIU/L-ACNC: 2.47 MCIU/ML (ref 0.49–4.67)

## 2017-12-04 PROCEDURE — 86580 TB INTRADERMAL TEST: CPT | Performed by: NURSE PRACTITIONER

## 2017-12-06 ENCOUNTER — NURSE ONLY (OUTPATIENT)
Dept: FAMILY MEDICINE CLINIC | Age: 72
End: 2017-12-06

## 2017-12-06 DIAGNOSIS — Z11.1 VISIT FOR MANTOUX TEST: Primary | ICD-10-CM

## 2017-12-06 LAB
INDURATION: 0
TB SKIN TEST: NEGATIVE

## 2018-02-22 ENCOUNTER — NURSE ONLY (OUTPATIENT)
Dept: CARDIOLOGY CLINIC | Age: 73
End: 2018-02-22
Payer: MEDICARE

## 2018-02-22 DIAGNOSIS — Z95.0 S/P CARDIAC PACEMAKER PROCEDURE: Primary | ICD-10-CM

## 2018-02-22 PROCEDURE — 93280 PM DEVICE PROGR EVAL DUAL: CPT | Performed by: INTERNAL MEDICINE

## 2018-05-23 ENCOUNTER — PROCEDURE VISIT (OUTPATIENT)
Dept: CARDIOLOGY CLINIC | Age: 73
End: 2018-05-23
Payer: MEDICARE

## 2018-05-23 DIAGNOSIS — Z95.0 S/P CARDIAC PACEMAKER PROCEDURE: Primary | ICD-10-CM

## 2018-05-23 PROCEDURE — 93294 REM INTERROG EVL PM/LDLS PM: CPT | Performed by: INTERNAL MEDICINE

## 2018-05-23 PROCEDURE — 93296 REM INTERROG EVL PM/IDS: CPT | Performed by: INTERNAL MEDICINE

## 2018-06-11 ENCOUNTER — OFFICE VISIT (OUTPATIENT)
Dept: CARDIOLOGY CLINIC | Age: 73
End: 2018-06-11
Payer: MEDICARE

## 2018-06-11 VITALS
DIASTOLIC BLOOD PRESSURE: 74 MMHG | HEIGHT: 72 IN | SYSTOLIC BLOOD PRESSURE: 130 MMHG | HEART RATE: 68 BPM | WEIGHT: 248 LBS | BODY MASS INDEX: 33.59 KG/M2

## 2018-06-11 DIAGNOSIS — E78.01 FAMILIAL HYPERCHOLESTEROLEMIA: ICD-10-CM

## 2018-06-11 DIAGNOSIS — Z95.0 PACEMAKER: ICD-10-CM

## 2018-06-11 DIAGNOSIS — I10 ESSENTIAL HYPERTENSION: Primary | ICD-10-CM

## 2018-06-11 DIAGNOSIS — I25.10 CORONARY ARTERY DISEASE INVOLVING NATIVE CORONARY ARTERY OF NATIVE HEART WITHOUT ANGINA PECTORIS: ICD-10-CM

## 2018-06-11 DIAGNOSIS — F17.200 SMOKING: ICD-10-CM

## 2018-06-11 PROCEDURE — 4004F PT TOBACCO SCREEN RCVD TLK: CPT | Performed by: NUCLEAR MEDICINE

## 2018-06-11 PROCEDURE — 1123F ACP DISCUSS/DSCN MKR DOCD: CPT | Performed by: NUCLEAR MEDICINE

## 2018-06-11 PROCEDURE — 4040F PNEUMOC VAC/ADMIN/RCVD: CPT | Performed by: NUCLEAR MEDICINE

## 2018-06-11 PROCEDURE — 99214 OFFICE O/P EST MOD 30 MIN: CPT | Performed by: NUCLEAR MEDICINE

## 2018-06-11 PROCEDURE — G8417 CALC BMI ABV UP PARAM F/U: HCPCS | Performed by: NUCLEAR MEDICINE

## 2018-06-11 PROCEDURE — G8427 DOCREV CUR MEDS BY ELIG CLIN: HCPCS | Performed by: NUCLEAR MEDICINE

## 2018-06-11 PROCEDURE — G8598 ASA/ANTIPLAT THER USED: HCPCS | Performed by: NUCLEAR MEDICINE

## 2018-06-11 PROCEDURE — 3017F COLORECTAL CA SCREEN DOC REV: CPT | Performed by: NUCLEAR MEDICINE

## 2018-06-12 ENCOUNTER — PREP FOR PROCEDURE (OUTPATIENT)
Dept: CARDIOLOGY | Age: 73
End: 2018-06-12

## 2018-06-12 ENCOUNTER — APPOINTMENT (OUTPATIENT)
Dept: CARDIAC CATH/INVASIVE PROCEDURES | Age: 73
End: 2018-06-12
Attending: NUCLEAR MEDICINE
Payer: MEDICARE

## 2018-06-12 RX ORDER — NITROGLYCERIN 0.4 MG/1
0.4 TABLET SUBLINGUAL EVERY 5 MIN PRN
Status: CANCELLED | OUTPATIENT
Start: 2018-06-12

## 2018-06-12 RX ORDER — ASPIRIN 81 MG/1
324 TABLET, CHEWABLE ORAL ONCE
Status: CANCELLED | OUTPATIENT
Start: 2018-06-12 | End: 2018-06-12

## 2018-06-12 RX ORDER — SODIUM CHLORIDE 0.9 % (FLUSH) 0.9 %
10 SYRINGE (ML) INJECTION PRN
Status: CANCELLED | OUTPATIENT
Start: 2018-06-12

## 2018-06-12 RX ORDER — SODIUM CHLORIDE 0.9 % (FLUSH) 0.9 %
10 SYRINGE (ML) INJECTION EVERY 12 HOURS SCHEDULED
Status: CANCELLED | OUTPATIENT
Start: 2018-06-12

## 2018-06-12 RX ORDER — DIPHENHYDRAMINE HYDROCHLORIDE 50 MG/ML
25 INJECTION INTRAMUSCULAR; INTRAVENOUS ONCE
Status: CANCELLED | OUTPATIENT
Start: 2018-06-12 | End: 2018-06-12

## 2018-06-12 RX ORDER — SODIUM CHLORIDE 9 MG/ML
INJECTION, SOLUTION INTRAVENOUS CONTINUOUS
Status: CANCELLED | OUTPATIENT
Start: 2018-06-12

## 2018-06-25 ENCOUNTER — PROCEDURE VISIT (OUTPATIENT)
Dept: CARDIOLOGY CLINIC | Age: 73
End: 2018-06-25

## 2018-06-25 DIAGNOSIS — Z95.0 S/P CARDIAC PACEMAKER PROCEDURE: Primary | ICD-10-CM

## 2018-08-16 ENCOUNTER — TELEPHONE (OUTPATIENT)
Dept: CARDIOLOGY CLINIC | Age: 73
End: 2018-08-16

## 2018-08-16 NOTE — TELEPHONE ENCOUNTER
Jennifer Ramirez from [facility], [place], [place] contacted us regarding patient. Mayo Crowe is following with Dr Darren Burgess at [facility]. Jennifer Ramirez requested last pacemaker check be faxed ([phone]) since [address] is living here now. She said Dr Ortiz Most pacer clinic at the office would be following him. Transfer in Medtr[employer] started.

## 2020-03-24 DIAGNOSIS — Z85.46 H/O PROSTATE CANCER: ICD-10-CM

## 2022-11-23 NOTE — PROGRESS NOTES
Assumed care from cath lab staff. Patient awake, alert, and denies discomfort. IV 0.9 NS infusing. Left chest has two dressings. Upper left is pacer dressing. Intact with steri strips, gauze and medipore. No swelling, firmness, or drainage. Mid chest dressing is loop explant. No swelling, firmness, or drainage. Sling in place. See post procedure flow sheet. Diabetic diet